# Patient Record
Sex: MALE | Race: WHITE | Employment: UNEMPLOYED | URBAN - METROPOLITAN AREA
[De-identification: names, ages, dates, MRNs, and addresses within clinical notes are randomized per-mention and may not be internally consistent; named-entity substitution may affect disease eponyms.]

---

## 2017-01-19 ENCOUNTER — TELEPHONE (OUTPATIENT)
Dept: FAMILY MEDICINE | Facility: CLINIC | Age: 57
End: 2017-01-19

## 2017-01-19 DIAGNOSIS — M54.2 CHRONIC NECK PAIN: Primary | ICD-10-CM

## 2017-01-19 DIAGNOSIS — S16.1XXD CERVICAL STRAIN, SUBSEQUENT ENCOUNTER: ICD-10-CM

## 2017-01-19 DIAGNOSIS — G89.29 CHRONIC NECK PAIN: Primary | ICD-10-CM

## 2017-01-19 NOTE — TELEPHONE ENCOUNTER
Gabapentin 300mg caps      Last Written Prescription Date: not on current med profile but has had an rx with refills for the last year  Last Quantity: na, # refills: na  Last Office Visit with G, P or [facility] prescribing provider: 11/4/16   Next 5 appointments (look out 90 days)     Jan 25, 2017 11:50 AM   [facility]el [facility]    [address] [facility] [id number]   [phone]            Jan 26, 2017 12:30 PM   Office Visit with Patel [facility]rez MD   [facility]    [address] [facility] [address]   [phone]                   CREATININE   Date Value Ref Range Status   09/06/2016 0.95 0.66 - 1.25 mg/dL Final     AST       55   8/27/2014  ALT       75   8/27/2014  BP Readings from Last 3 Encounters:   11/04/16 144/80   09/07/16 126/89   09/06/16 134/86   Thank You  Leah Esparza [facility]  [phone]

## 2017-01-20 RX ORDER — GABAPENTIN 600 MG/1
600 TABLET ORAL 2 TIMES DAILY
Qty: 180 TABLET | Refills: 3 | Status: SHIPPED | OUTPATIENT
Start: 2017-01-20 | End: 2017-01-24

## 2017-01-23 ENCOUNTER — TELEPHONE (OUTPATIENT)
Dept: FAMILY MEDICINE | Facility: CLINIC | Age: 57
End: 2017-01-23

## 2017-01-23 DIAGNOSIS — G89.29 CHRONIC NECK PAIN: Primary | ICD-10-CM

## 2017-01-23 DIAGNOSIS — M54.2 CHRONIC NECK PAIN: Primary | ICD-10-CM

## 2017-01-23 NOTE — TELEPHONE ENCOUNTER
Called to do PA over the phone.    They want to know if there is a reason he cannot take the capsules, which are covered by insurance.    If you want PA on the tablets they need to know why he cannot take the capsules    Please advise.    Nataly Bell MA/TC

## 2017-01-23 NOTE — TELEPHONE ENCOUNTER
Gabapentin requires a prior authorization.    [facility]: [phone]  ID:  [id number]      Karo Delgado    Pharmacy Technician  [facility]

## 2017-01-23 NOTE — TELEPHONE ENCOUNTER
Please resend rx for Gabapentin 300mg for 1/20/17.  Pharmacy did not receive.      Thank You  Leah Esparza, [facility]  [phone]

## 2017-01-24 RX ORDER — GABAPENTIN 300 MG/1
600 CAPSULE ORAL
Qty: 360 CAPSULE | Refills: 3 | Status: SHIPPED | OUTPATIENT
Start: 2017-01-24 | End: 2018-02-27

## 2017-01-25 NOTE — TELEPHONE ENCOUNTER
Capsules are fine.     The capsules are 300 mg so he would have to take 2 bid - let him know.    Patel Torrez M.D.

## 2017-01-25 NOTE — TELEPHONE ENCOUNTER
Called and informed patient. He said he has always taken the capsules. Never had tablets. I told him maybe the RX was ordered wrong???Nataly Bell MA/WENCESLAO

## 2017-02-06 ENCOUNTER — OFFICE VISIT (OUTPATIENT)
Dept: FAMILY MEDICINE | Facility: CLINIC | Age: 57
End: 2017-02-06
Payer: COMMERCIAL

## 2017-02-06 VITALS
OXYGEN SATURATION: 98 % | DIASTOLIC BLOOD PRESSURE: 94 MMHG | HEART RATE: 89 BPM | WEIGHT: 179 LBS | SYSTOLIC BLOOD PRESSURE: 180 MMHG | TEMPERATURE: 97.8 F | BODY MASS INDEX: 26.51 KG/M2 | HEIGHT: 69 IN

## 2017-02-06 DIAGNOSIS — S16.1XXD CERVICAL STRAIN, SUBSEQUENT ENCOUNTER: ICD-10-CM

## 2017-02-06 DIAGNOSIS — M54.2 CHRONIC NECK PAIN: Primary | ICD-10-CM

## 2017-02-06 DIAGNOSIS — G89.29 CHRONIC NECK PAIN: Primary | ICD-10-CM

## 2017-02-06 PROCEDURE — 80305 DRUG TEST PRSMV DIR OPT OBS: CPT | Performed by: FAMILY MEDICINE

## 2017-02-06 PROCEDURE — 99213 OFFICE O/P EST LOW 20 MIN: CPT | Performed by: FAMILY MEDICINE

## 2017-02-06 RX ORDER — OXYCODONE AND ACETAMINOPHEN 5; 325 MG/1; MG/1
1 TABLET ORAL 2 TIMES DAILY PRN
Qty: 60 TABLET | Refills: 0 | Status: SHIPPED | OUTPATIENT
Start: 2017-04-06 | End: 2017-05-08

## 2017-02-06 RX ORDER — OXYCODONE AND ACETAMINOPHEN 5; 325 MG/1; MG/1
1 TABLET ORAL 2 TIMES DAILY PRN
Qty: 60 TABLET | Refills: 0 | Status: SHIPPED | OUTPATIENT
Start: 2017-03-06 | End: 2017-05-08

## 2017-02-06 RX ORDER — OXYCODONE AND ACETAMINOPHEN 5; 325 MG/1; MG/1
1 TABLET ORAL 2 TIMES DAILY PRN
Qty: 60 TABLET | Refills: 0 | Status: SHIPPED | OUTPATIENT
Start: 2017-02-06 | End: 2017-05-08

## 2017-02-06 NOTE — MR AVS SNAPSHOT
"              After Visit Summary   2/6/2017    Marin Mcneil    MRN: [id number]           Patient Information     Date Of Birth          1960        Visit Information        Provider Department      2/6/2017 2:30 PM Patel Torrez MD [facility]        Today's Diagnoses     Chronic neck pain    -  1     Cervical strain, subsequent encounter           Care Instructions    See you back in 3-4 weeks for the shoulder. We can recheck your blood pressure at that time.        Follow-ups after your visit        Who to contact     If you have questions or need follow up information about today's clinic visit or your schedule please contact [facility] directly at [phone].  Normal or non-critical lab and imaging results will be communicated to you by MyChart, letter or phone within 4 business days after the clinic has received the results. If you do not hear from us within 7 days, please contact the clinic through [facility] or phone. If you have a critical or abnormal lab result, we will notify you by phone as soon as possible.  Submit refill requests through [employer] or call your pharmacy and they will forward the refill request to us. Please allow 3 business days for your refill to be completed.          Additional Information About Your Visit        MyChart Information     [employer] gives you secure access to your electronic health record. If you see a primary care provider, you can also send messages to your care team and make appointments. If you have questions, please call your primary care clinic.  If you do not have a primary care provider, please call [phone] and they will assist you.        Care EveryWhere ID     This is your Care EveryWhere ID. This could be used by other organizations to access your [facility] medical records  [id number]        Your Vitals Were     Pulse Temperature Height BMI (Body Mass Index) Pulse Oximetry       89 97.8  F (36.6  C) (Oral) 5' 9\" (1.753 " m) 26.42 kg/m2 98%        Blood Pressure from Last 3 Encounters:   02/06/17 180/94   11/04/16 144/80   09/07/16 126/89    Weight from Last 3 Encounters:   02/06/17 179 lb (81.194 kg)   11/04/16 184 lb (83.462 kg)   09/07/16 185 lb (83.915 kg)              Today, you had the following     No orders found for display         Today's Medication Changes          These changes are accurate as of: 2/6/17  2:48 PM.  If you have any questions, ask your nurse or doctor.               These medicines have changed or have updated prescriptions.        Dose/Directions    * buPROPion 150 MG 24 hr tablet   Commonly known as:  WELLBUTRIN XL   This may have changed:  how much to take   Used for:  Moderate persistent asthma without complication   Changed by:  Patel Torrez MD        Dose:  150 mg   Take 1 tablet (150 mg) by mouth every morning   Quantity:  180 tablet   Refills:  1       * buPROPion 300 MG 24 hr tablet   Commonly known as:  WELLBUTRIN XL   This may have changed:  Another medication with the same name was changed. Make sure you understand how and when to take each.   Used for:  Major depressive disorder, recurrent episode, mild (H), Generalized anxiety disorder   Changed by:  Bo Guevara PA-C        Dose:  300 mg   Take 1 tablet (300 mg) by mouth every morning   Quantity:  90 tablet   Refills:  1       * Notice:  This list has 2 medication(s) that are the same as other medications prescribed for you. Read the directions carefully, and ask your doctor or other care provider to review them with you.         Where to get your medicines      Some of these will need a paper prescription and others can be bought over the counter.  Ask your nurse if you have questions.     Bring a paper prescription for each of these medications    - oxyCODONE-acetaminophen 5-325 MG per tablet  - oxyCODONE-acetaminophen 5-325 MG per tablet  - oxyCODONE-acetaminophen 5-325 MG per tablet             Primary Care Provider Office  Phone # Fax #    Patel Torrez [phone]       [facility] [address] [address] [facility] [address]        Thank you!     Thank you for choosing [facility]  for your care. Our goal is always to provide you with excellent care. Hearing back from our patients is one way we can continue to improve our services. Please take a few minutes to complete the written survey that you may receive in the mail after your visit with us. Thank you!             Your Updated Medication List - Protect others around you: Learn how to safely use, store and throw away your medicines at www.disposemymeds.org.          This list is accurate as of: 2/6/17  2:48 PM.  Always use your most recent med list.                   Brand Name Dispense Instructions for use    albuterol 108 (90 BASE) MCG/ACT Inhaler    PROAIR HFA/PROVENTIL HFA/VENTOLIN HFA    1 Inhaler    Inhale 2 puffs into the lungs every 4 hours as needed for shortness of breath / dyspnea       atorvastatin 40 MG tablet    LIPITOR    90 tablet    Take 1 tablet (40 mg) by mouth daily       azelastine 0.05 % Soln ophthalmic solution    OPTIVAR    1 Bottle    Apply 1 drop to eye 2 times daily       budesonide-formoterol 80-4.5 MCG/ACT Inhaler    SYMBICORT    1 Inhaler    Inhale 2 puffs into the lungs 2 times daily       * buPROPion 150 MG 24 hr tablet    WELLBUTRIN XL    180 tablet    Take 1 tablet (150 mg) by mouth every morning       * buPROPion 300 MG 24 hr tablet    WELLBUTRIN XL    90 tablet    Take 1 tablet (300 mg) by mouth every morning       cetirizine 10 MG tablet    zyrTEC    90 tablet    Take 1 tablet (10 mg) by mouth every evening       doxazosin 4 MG tablet    CARDURA    90 tablet    Take 1 tablet (4 mg) by mouth At Bedtime       fluticasone 50 MCG/ACT spray    FLONASE    1 Bottle    Spray 1-2 sprays into both nostrils daily       gabapentin 300 MG capsule    NEURONTIN    360 capsule    Take 2 capsules (600 mg) by mouth 2 times  daily       lisinopril 10 MG tablet    PRINIVIL/ZESTRIL    90 tablet    Take 1 tablet (10 mg) by mouth daily       methocarbamol 500 MG tablet    ROBAXIN    60 tablet    Take 1 tablet (500 mg) by mouth 2 times daily as needed for muscle spasms       mometasone-formoterol 100-5 MCG/ACT oral inhaler    DULERA    13 g    Inhale 2 puffs into the lungs 2 times daily       montelukast 10 MG tablet    SINGULAIR    90 tablet    Take 1 tablet (10 mg) by mouth At Bedtime       olopatadine 0.1 % ophthalmic solution    PATANOL    5 mL    Place 1 drop into both eyes 2 times daily       omeprazole 20 MG CR capsule    priLOSEC    90 capsule    Take 1 capsule (20 mg) by mouth daily       * oxyCODONE-acetaminophen 5-325 MG per tablet    PERCOCET    60 tablet    Take 1 tablet by mouth 2 times daily as needed for moderate to severe pain       * oxyCODONE-acetaminophen 5-325 MG per tablet   Start taking on:  3/6/2017    PERCOCET    60 tablet    Take 1 tablet by mouth 2 times daily as needed for moderate to severe pain       * oxyCODONE-acetaminophen 5-325 MG per tablet   Start taking on:  4/6/2017    PERCOCET    60 tablet    Take 1 tablet by mouth 2 times daily as needed for moderate to severe pain       * Notice:  This list has 5 medication(s) that are the same as other medications prescribed for you. Read the directions carefully, and ask your doctor or other care provider to review them with you.
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
## 2017-02-06 NOTE — Clinical Note
[facility]    02/06/2017    Patient: Marin Mcneil  YOB: 1960  Medical Record Number: [id number]                                                                  Controlled Substance Agreement  I understand that my care provider has prescribed controlled substances (narcotics, tranquilizers, and/or stimulants) to help manage my condition(s).  I am taking this medicine to help me function or work.  I know that this is strong medicine.  It could have serious side effects and even cause a dependency on the drug.  If I stop these medicines suddenly, I could have severe withdrawal symptoms.    The risks, benefits, and side effects of these medication(s) were explained to me.  I agree that:  1. I will take part in other treatments as advised by my provider.  This may be psychiatry or counseling, physical therapy, behavioral therapy, group treatment, or a referral to a pain clinic.  I will reduce or stop my medicine when my provider tells me to do so.   2. I will take my medicines as prescribed.  I will not change the dose or schedule unless my provider tells me to.  There will be no refills if I  run out early.   I may be contacted at any time without warning and asked to complete a drug test or pill count.   3. I will keep all my appointments at the clinic.  If I miss appointments or fail to follow instructions, my provider may stop my medicine.  4. I will not ask other providers to prescribe controlled substances. And I will not accept controlled substances from other people. If I need another prescribed controlled substance for a new reason, I will notify my provider within one business day.  5. If I enroll in the [place] Medical Marijuana program, I will tell my provider.  I will also sign an agreement to share my medical records with my provider.  6. I will use one pharmacy to fill all of my controlled substance prescriptions.  If my prescription is mailed to my pharmacy, it may take 5  to 7 days for my medicine to be ready.  7. I understand that my provider, clinic care team, and pharmacy can track controlled substance prescriptions from other providers through a central database (prescription monitoring program).  8. I will bring in my list of medications (or my medicine bottles) each time I come to the clinic.  REV- 04/2016                                                                                                                                            Page 1 of 2      [facility]    02/06/2017    Patient: Marin Mcneil  YOB: 1960  Medical Record Number: [id number]    9. Refills of controlled substances will be made only during office hours.  It is up to me to make sure that I do not run out of my medicines on weekends or holidays.    10. I am responsible for my prescriptions.  If the medicine is lost or stolen, it will not be replaced.   I also agree not to share these medicines with anyone.  11. I agree to not use ANY illegal or recreational drugs.  This includes marijuana, cocaine, bath salts or other drugs.  I agree not to use alcohol unless my provider says I may.  I agree to give urine samples whenever asked.  If I fail to give a urine sample, the provider may stop my medicine.     12. I will tell my nurse or provider right away if I become pregnant or have a new medical problem treated outside of [facility]  13. I understand that this medicine can affect my thinking and judgment.  It may be unsafe for me to drive, use machinery and do dangerous tasks.  I will not do any of these things until I know how the medicine affects me.  If my dose changes, I will wait to see how it affects me.  I will contact my provider if I have concerns about medicine side effects.  I understand that if I do not follow any of the conditions above, my prescriptions or treatment may be stopped.    I agree that my provider, clinic care team, and pharmacy may work with any  city, state or federal law enforcement agency that investigates the misuse, sale, or other diversion of my controlled medicine. I will allow my provider to discuss my care with or share a copy of this agreement with any other treating provider, pharmacy or emergency room where I receive care.  I agree to give up (waive) any right of privacy or confidentiality with respect to these authorizations.   I have read this agreement and have asked questions about anything I did not understand.   ___________________________________    ___________________________  Patient Signature                                                           Date and Time  ___________________________________     ____________________________  Witness                                                                            Date and Time  ___________________________________  REFUGIO LAGUERRE MD  REV-  04/2016                                                                                                                                                                 Page 2 of 2  Opioid Pain Medicines (also known as Narcotics)  What You Need to Know      What are opioids?   Opioids are pain medicines that must be prescribed by a doctor. Examples are:     morphine (MS Contin, Cece)    oxycodone (Oxycontin)    oxycodone and acetaminophen (Percocet)    hydrocodone and acetaminophen (Vicodin, Norco)     fentanyl patch (Duragesic)     hydromorphone (Dilaudid)     methadone     What do opioids do well?   Opioids are best for short-term pain after a surgery or injury. They also work well for cancer pain. Unlike other pain medicines, they do not cause liver or kidney failure or ulcers. They may help some people with long-lasting (chronic) pain.     What do opioids NOT do well?   Opioids never get rid of pain entirely, and they do not work well for most patients with chronic pain. Opioids do not reduce swelling, one of the causes of pain. They also don t  work well for nerve pain.     Side effects  Talk to your doctor before you start or decide to keep taking one of these medicines. Side effects include:    Lowers your breathing rate enough that it could cause death    Death due to taking more than the prescribed dose    Serious lifelong opioid use      Dependence is not the same as addiction. Addiction is when people keep using a substance that harms their body, their mind or their relations with others. If you have a history of drug or alcohol abuse, taking opioids can cause a relapse.  Over time, opioids don t work as well. Most people will need higher and higher doses. The higher the dose, the more serious the side effects. We don t know the long-term effects of opioids.   People who have used opioids for a long time have a lower quality of life, worse depression, higher levels of pain and more visits to doctors.  Overdose from prescription drugs is the second leading cause of death in the [place] The risk of overdose rises when opioids are taken with other drugs such as:    Medicines used for anxiety and panic attacks (such as lorazepam, alprazolam, clonazepam    Other sedatives    Alcohol    Illegal drugs such as heroin  Never share your opioids with others. Be sure to store opioids in a secure place, locked if possible.Young children can easily swallow them and overdose.     Are there other ways to manage pain?  Ways to help reduce pain:    Exercise every day.    Treat health problems that may be causing pain.    Treat mental health problems like depression and anxiety.     Worse depression symptoms; Less pleasure in things you usually enjoy    Feeling tired or sluggish    Slower thoughts or cloudy thinking    Being more sensitive to pain over time; Pain is harder to control.    Trouble sleeping or restless sleep    Changes in hormone levels (for example, less testosterone).     Changes in sex drive or ability to have sex    Long lasting nausea and  constipation    Trouble breathing while asleep; This is worse with lung problems like COPD or sleep apnea.    Unsafe driving    Getting sick more often    Itching    Feeling dizzy    Dry mouth    Sweating    Trouble emptying the bladder (peeing). This is worse if you have an enlarged prostate or get urinary tract infections (UTIs).    What else should I know about opioids?  When someone takes opioids for too long or too often, they become dependent. This means that if you stop or reduce the medicine too quickly, you will have withdrawal symptoms.          Practice good sleep habits.  Try to go to bed and get up at the same time every day.    Stop smoking.  Tobacco use can make pain worse.    Do things that you enjoy.    Find a way to work through pain without drugs.  Try deep breathing, meditation, visual imagery and aromatherapy.    Ask your doctor to help you create a plan to manage your pain.
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
## 2017-02-07 LAB
AMPHETAMINES UR QL: ABNORMAL
BARBITURATES UR QL: ABNORMAL
BENZODIAZ UR QL: ABNORMAL
CANNABINOIDS UR QL: ABNORMAL
COCAINE UR QL: ABNORMAL
MDA UR QL SCN: ABNORMAL
METHADONE UR QL SCN: ABNORMAL
METHAMPHET UR QL SCN: ABNORMAL
OPIATES UR QL SCN: ABNORMAL
OXYCODONE UR QL: ABNORMAL
PCP UR QL SCN: ABNORMAL
TRICYCLICS UR QL SCN: ABNORMAL

## 2017-03-30 DIAGNOSIS — S16.1XXD CERVICAL STRAIN, SUBSEQUENT ENCOUNTER: ICD-10-CM

## 2017-03-30 DIAGNOSIS — G89.29 CHRONIC NECK PAIN: ICD-10-CM

## 2017-03-30 DIAGNOSIS — M54.2 CHRONIC NECK PAIN: ICD-10-CM

## 2017-03-30 RX ORDER — METHOCARBAMOL 500 MG/1
500 TABLET, FILM COATED ORAL 2 TIMES DAILY PRN
Qty: 60 TABLET | Refills: 5 | Status: SHIPPED | OUTPATIENT
Start: 2017-03-30 | End: 2017-07-26

## 2017-03-30 NOTE — TELEPHONE ENCOUNTER
Methocarbamol      Last Written Prescription Date: 9-7-16  Last Fill Quantity: 60,  # refills: 5   Last Office Visit with G, P or [facility] prescribing provider: 2-6-17    [facility]  Pharmacy [facility]  On Behalf of [facility]

## 2017-05-05 DIAGNOSIS — I10 BENIGN ESSENTIAL HYPERTENSION: ICD-10-CM

## 2017-05-05 DIAGNOSIS — F41.1 GENERALIZED ANXIETY DISORDER: ICD-10-CM

## 2017-05-05 DIAGNOSIS — F33.0 MAJOR DEPRESSIVE DISORDER, RECURRENT EPISODE, MILD (H): ICD-10-CM

## 2017-05-05 DIAGNOSIS — N40.1 BENIGN NON-NODULAR PROSTATIC HYPERPLASIA WITH LOWER URINARY TRACT SYMPTOMS: ICD-10-CM

## 2017-05-05 NOTE — TELEPHONE ENCOUNTER
Doxazosin     Last Written Prescription Date: 9/6/16  Last Fill Quantity: 90, # refills: 1  Last Office Visit with [facility], [facility] or [facility] prescribing provider: 9/7/16   Next 5 appointments (look out 90 days)     May 08, 2017 11:10 AM CDT   [facility]    [address] [facility] [address]   [phone]                   BP Readings from Last 3 Encounters:   02/06/17 (!) 180/94   11/04/16 144/80   09/07/16 126/89     Last PHQ-9 score on record=   PHQ-9 SCORE 9/6/2016   Total Score -   Total Score MyChart -   Total Score 8       Buproprion       Last Written Prescription Date: 9/6/16  Last Fill Quantity: 90; # refills: 1  Last Office Visit with [facility], [facility] or [facility] prescribing provider:  9/7/16   Next 5 appointments (look out 90 days)     May 08, 2017 11:10 AM CDT   [facility]    [address] [facility] [address]   [phone]                   Last PHQ-9 score on record=   PHQ-9 SCORE 9/6/2016   Total Score -   Total Score MyChart -   Total Score 8       Lab Results   Component Value Date    AST 55 08/27/2014     Lab Results   Component Value Date    ALT 75 08/27/2014         Lisinopril      Last Written Prescription Date: 9/6/16  Last Fill Quantity: 90, # refills: 1  Last Office Visit with G, [facility] or [facility] prescribing provider: 9/7/16  Next 5 appointments (look out 90 days)     May 08, 2017 11:10 AM CDT   [facility]    [address] [facility] [address]   [phone]                   Potassium   Date Value Ref Range Status   09/06/2016 4.4 3.4 - 5.3 mmol/L Final     Creatinine   Date Value Ref Range Status   09/06/2016 0.95 0.66 - 1.25 mg/dL Final     BP Readings from Last 3 Encounters:   02/06/17 (!) 180/94   11/04/16 144/80   09/07/16 126/89          Karo Raysbie    Pharmacy Technician  [facility]

## 2017-05-08 ENCOUNTER — OFFICE VISIT (OUTPATIENT)
Dept: FAMILY MEDICINE | Facility: CLINIC | Age: 57
End: 2017-05-08
Payer: MEDICAID

## 2017-05-08 VITALS
OXYGEN SATURATION: 98 % | TEMPERATURE: 97.4 F | WEIGHT: 172 LBS | HEART RATE: 71 BPM | BODY MASS INDEX: 25.4 KG/M2 | SYSTOLIC BLOOD PRESSURE: 128 MMHG | DIASTOLIC BLOOD PRESSURE: 80 MMHG

## 2017-05-08 DIAGNOSIS — N40.1 BENIGN NON-NODULAR PROSTATIC HYPERPLASIA WITH LOWER URINARY TRACT SYMPTOMS: ICD-10-CM

## 2017-05-08 DIAGNOSIS — E78.5 DYSLIPIDEMIA: ICD-10-CM

## 2017-05-08 DIAGNOSIS — F41.1 GENERALIZED ANXIETY DISORDER: ICD-10-CM

## 2017-05-08 DIAGNOSIS — J45.40 MODERATE PERSISTENT ASTHMA WITHOUT COMPLICATION: ICD-10-CM

## 2017-05-08 DIAGNOSIS — F33.0 MAJOR DEPRESSIVE DISORDER, RECURRENT EPISODE, MILD (H): Primary | ICD-10-CM

## 2017-05-08 DIAGNOSIS — M79.621 PAIN OF RIGHT UPPER ARM: ICD-10-CM

## 2017-05-08 DIAGNOSIS — I10 ESSENTIAL HYPERTENSION WITH GOAL BLOOD PRESSURE LESS THAN 140/90: ICD-10-CM

## 2017-05-08 PROCEDURE — 99214 OFFICE O/P EST MOD 30 MIN: CPT | Performed by: FAMILY MEDICINE

## 2017-05-08 RX ORDER — OXYCODONE AND ACETAMINOPHEN 5; 325 MG/1; MG/1
1 TABLET ORAL 2 TIMES DAILY PRN
Qty: 60 TABLET | Refills: 0 | Status: SHIPPED | OUTPATIENT
Start: 2017-05-08 | End: 2017-05-08

## 2017-05-08 RX ORDER — BUPROPION HYDROCHLORIDE 300 MG/1
300 TABLET ORAL EVERY MORNING
Qty: 30 TABLET | Refills: 0 | Status: SHIPPED | OUTPATIENT
Start: 2017-05-08 | End: 2017-05-08

## 2017-05-08 RX ORDER — BUPROPION HYDROCHLORIDE 300 MG/1
300 TABLET ORAL EVERY MORNING
Qty: 90 TABLET | Refills: 1 | Status: SHIPPED | OUTPATIENT
Start: 2017-05-08 | End: 2017-07-27

## 2017-05-08 RX ORDER — OXYCODONE AND ACETAMINOPHEN 5; 325 MG/1; MG/1
1 TABLET ORAL 2 TIMES DAILY PRN
Qty: 60 TABLET | Refills: 0 | Status: SHIPPED | OUTPATIENT
Start: 2017-07-08 | End: 2017-05-08

## 2017-05-08 RX ORDER — OXYCODONE AND ACETAMINOPHEN 5; 325 MG/1; MG/1
1 TABLET ORAL 2 TIMES DAILY PRN
Qty: 60 TABLET | Refills: 0 | Status: SHIPPED | OUTPATIENT
Start: 2017-06-08 | End: 2017-05-08

## 2017-05-08 RX ORDER — DOXAZOSIN 4 MG/1
4 TABLET ORAL AT BEDTIME
Qty: 90 TABLET | Refills: 0 | Status: SHIPPED | OUTPATIENT
Start: 2017-05-08 | End: 2017-05-08

## 2017-05-08 RX ORDER — DOXAZOSIN 4 MG/1
4 TABLET ORAL AT BEDTIME
Qty: 90 TABLET | Refills: 1 | Status: SHIPPED | OUTPATIENT
Start: 2017-05-08 | End: 2017-08-22

## 2017-05-08 RX ORDER — BUPROPION HYDROCHLORIDE 300 MG/1
TABLET ORAL
Qty: 90 TABLET | Refills: 1 | OUTPATIENT
Start: 2017-05-08

## 2017-05-08 RX ORDER — LISINOPRIL 10 MG/1
TABLET ORAL
Qty: 90 TABLET | Refills: 1 | OUTPATIENT
Start: 2017-05-08

## 2017-05-08 RX ORDER — LISINOPRIL 10 MG/1
10 TABLET ORAL DAILY
Qty: 90 TABLET | Refills: 0 | Status: SHIPPED | OUTPATIENT
Start: 2017-05-08 | End: 2017-05-08

## 2017-05-08 RX ORDER — LISINOPRIL 10 MG/1
10 TABLET ORAL DAILY
Qty: 90 TABLET | Refills: 1 | Status: SHIPPED | OUTPATIENT
Start: 2017-05-08 | End: 2017-11-20

## 2017-05-08 RX ORDER — DOXAZOSIN 4 MG/1
TABLET ORAL
Qty: 90 TABLET | Refills: 1 | OUTPATIENT
Start: 2017-05-08

## 2017-05-08 ASSESSMENT — ANXIETY QUESTIONNAIRES
3. WORRYING TOO MUCH ABOUT DIFFERENT THINGS: SEVERAL DAYS
IF YOU CHECKED OFF ANY PROBLEMS ON THIS QUESTIONNAIRE, HOW DIFFICULT HAVE THESE PROBLEMS MADE IT FOR YOU TO DO YOUR WORK, TAKE CARE OF THINGS AT HOME, OR GET ALONG WITH OTHER PEOPLE: SOMEWHAT DIFFICULT
5. BEING SO RESTLESS THAT IT IS HARD TO SIT STILL: SEVERAL DAYS
7. FEELING AFRAID AS IF SOMETHING AWFUL MIGHT HAPPEN: SEVERAL DAYS
2. NOT BEING ABLE TO STOP OR CONTROL WORRYING: SEVERAL DAYS
1. FEELING NERVOUS, ANXIOUS, OR ON EDGE: MORE THAN HALF THE DAYS
6. BECOMING EASILY ANNOYED OR IRRITABLE: SEVERAL DAYS
GAD7 TOTAL SCORE: 8

## 2017-05-08 ASSESSMENT — PATIENT HEALTH QUESTIONNAIRE - PHQ9: 5. POOR APPETITE OR OVEREATING: SEVERAL DAYS

## 2017-05-08 NOTE — LETTER
My Asthma Action Plan  Name: Marin Mcneil   YOB: 1960  Date: 5/8/2017   My doctor: REFUGIO LAGUERRE MD   My clinic: [facility]        My Control Medicine: { :922743}  My Rescue Medicine: { :563693}  {AAP include Oral Steroid:011786} My Asthma Severity: { :926136}  Avoid your asthma triggers: { :949452}        {Is patient a child or adult?:496143:::0}       GREEN ZONE     Good Control    I feel good    No cough or wheeze    Can work, sleep and play without asthma symptoms       Take your asthma control medicine every day.     1. If exercise triggers your asthma, take your rescue medication    15 minutes before exercise or sports, and    During exercise if you have asthma symptoms  2. Spacer to use with inhaler: If you have a spacer, make sure to use it with your inhaler             YELLOW ZONE     Getting Worse  I have ANY of these:    I do not feel good    Cough or wheeze    Chest feels tight    Wake up at night   1. Keep taking your Green Zone medications  2. Start taking your rescue medicine:    every 20 minutes for up to 1 hour. Then every 4 hours for 24-48 hours.  3. If you stay in the Yellow Zone for more than 12-24 hours, contact your doctor.  4. If you do not return to the Green Zone in 12-24 hours or you get worse, start taking your oral steroid medicine if prescribed by your provider.           RED ZONE     Medical Alert - Get Help  I have ANY of these:    I feel awful    Medicine is not helping    Breathing getting harder    Trouble walking or talking    Nose opens wide to breathe       1. Take your rescue medicine NOW  2. If your provider has prescribed an oral steroid medicine, start taking it NOW  3. Call your doctor NOW  4. If you are still in the Red Zone after 20 minutes and you have not reached your doctor:    Take your rescue medicine again and    Call 911 or go to the emergency room right away    See your regular doctor within 2 weeks of an Emergency Room or Urgent  Care visit for follow-up treatment.            Annual Reminders:  Meet with Asthma Educator,  Flu Shot in the Fall, consider Pneumonia Vaccination for patients with asthma (aged 19 and older).    Pharmacy: [place] [phone], [address]                    Asthma Triggers  How To Control Things That Make Your Asthma Worse    Triggers are things that make your asthma worse.  Look at the list below to help you find your triggers and what you can do about them.  You can help prevent asthma flare-ups by staying away from your triggers.      Trigger                                                          What you can do   Cigarette Smoke  Tobacco smoke can make asthma worse. Do not allow smoking in your home, car or around you.  Be sure no one smokes at a child s day care or school.  If you smoke, ask your health care provider for ways to help you quit.  Ask family members to quit too.  Ask your health care provider for a referral to Quit Plan to help you quit smoking, or call 5-552-853-PLAN.     Colds, Flu, Bronchitis  These are common triggers of asthma. Wash your hands often.  Don t touch your eyes, nose or mouth.  Get a flu shot every year.     Dust Mites  These are tiny bugs that live in cloth or carpet. They are too small to see. Wash sheets and blankets in hot water every week.   Encase pillows and mattress in dust mite proof covers.  Avoid having carpet if you can. If you have carpet, vacuum weekly.   Use a dust mask and HEPA vacuum.   Pollen and Outdoor Mold  Some people are allergic to trees, grass, or weed pollen, or molds. Try to keep your windows closed.  Limit time out doors when pollen count is high.   Ask you health care provider about taking medicine during allergy season.     Animal Dander  Some people are allergic to skin flakes, urine or saliva from pets with fur or feathers. Keep pets with fur or feathers out of your home.    If you can t keep the pet outdoors,  then keep the pet out of your bedroom.  Keep the bedroom door closed.  Keep pets off cloth furniture and away from stuffed toys.     Mice, Rats, and Cockroaches  Some people are allergic to the waste from these pests.   Cover food and garbage.  Clean up spills and food crumbs.  Store grease in the refrigerator.   Keep food out of the bedroom.   Indoor Mold  This can be a trigger if your home has high moisture. Fix leaking faucets, pipes, or other sources of water.   Clean moldy surfaces.  Dehumidify basement if it is damp and smelly.   Smoke, Strong Odors, and Sprays  These can reduce air quality. Stay away from strong odors and sprays, such as perfume, powder, hair spray, paints, smoke incense, paint, cleaning products, candles and new carpet.   Exercise or Sports  Some people with asthma have this trigger. Be active!  Ask your doctor about taking medicine before sports or exercise to prevent symptoms.    Warm up for 5-10 minutes before and after sports or exercise.     Other Triggers of Asthma  Cold air:  Cover your nose and mouth with a scarf.  Sometimes laughing or crying can be a trigger.  Some medicines and food can trigger asthma.

## 2017-05-08 NOTE — MR AVS SNAPSHOT
After Visit Summary   5/8/2017    Marin Mcneil    MRN: [id number]           Patient Information     Date Of Birth          1960        Visit Information        Provider Department      5/8/2017 11:10 AM Patel Torrez MD [facility]        Today's Diagnoses     Major depressive disorder, recurrent episode, mild (H)    -  1    Generalized anxiety disorder        Benign non-nodular prostatic hyperplasia with lower urinary tract symptoms        Benign essential hypertension        Pain of right upper arm        Dyslipidemia        Essential hypertension with goal blood pressure less than 140/90        Moderate persistent asthma without complication          Care Instructions    1. Give fasting blood sample on the next appointment.    2. Make an appointment with orthopedics for the right arm pain - call [phone] or stop by the .    3. If all is well, see you back in 6 months.              Follow-ups after your visit        Additional Services     ORTHOPEDICS ADULT REFERRAL       Your provider has referred you to: [facility]: [facility] [phone]   [email or web address]    Please be aware that coverage of these services is subject to the terms and limitations of your health insurance plan.  Call member services at your health plan with any benefit or coverage questions.      Please bring the following to your appointment:    >>   Any x-rays, CTs or MRIs which have been performed.  Contact the facility where they were done to arrange for  prior to your scheduled appointment.  Any new CT, MRI or other procedures ordered by your specialist must be performed at a [facility] facility or coordinated by your clinic's referral office.    >>   List of current medications   >>   This referral request   >>   Any documents/labs given to you for this referral                  Future tests that were ordered for you today     Open Future  Orders        Priority Expected Expires Ordered    Lipid panel reflex to direct LDL Routine  12/8/2017 5/8/2017    Basic metabolic panel Routine  12/8/2017 5/8/2017    ALT Routine  12/8/2017 5/8/2017            Who to contact     If you have questions or need follow up information about today's clinic visit or your schedule please contact [facility] directly at [phone].  Normal or non-critical lab and imaging results will be communicated to you by mChronhart, letter or phone within 4 business days after the clinic has received the results. If you do not hear from us within 7 days, please contact the clinic through Altaviant or phone. If you have a critical or abnormal lab result, we will notify you by phone as soon as possible.  Submit refill requests through Sparkbrowser or call your pharmacy and they will forward the refill request to us. Please allow 3 business days for your refill to be completed.          Additional Information About Your Visit        mChronharPayPal Information     Sparkbrowser gives you secure access to your electronic health record. If you see a primary care provider, you can also send messages to your care team and make appointments. If you have questions, please call your primary care clinic.  If you do not have a primary care provider, please call [phone] and they will assist you.        Care EveryWhere ID     This is your Care EveryWhere ID. This could be used by other organizations to access your [facility] medical records  [id number]        Your Vitals Were     Pulse Temperature Pulse Oximetry BMI (Body Mass Index)          71 97.4  F (36.3  C) (Oral) 98% 25.4 kg/m2         Blood Pressure from Last 3 Encounters:   05/08/17 128/80   02/06/17 (!) 180/94   11/04/16 144/80    Weight from Last 3 Encounters:   05/08/17 172 lb (78 kg)   02/06/17 179 lb (81.2 kg)   11/04/16 184 lb (83.5 kg)              We Performed the Following     Asthma Action Plan (AAP)     DEPRESSION ACTION PLAN (DAP)      ORTHOPEDICS ADULT REFERRAL          Today's Medication Changes          These changes are accurate as of: 5/8/17 11:46 AM.  If you have any questions, ask your nurse or doctor.               These medicines have changed or have updated prescriptions.        Dose/Directions    buPROPion 300 MG 24 hr tablet   Commonly known as:  WELLBUTRIN XL   This may have changed:  additional instructions   Used for:  Major depressive disorder, recurrent episode, mild (H), Generalized anxiety disorder   Changed by:  Patel Torrez MD        Dose:  300 mg   Take 1 tablet (300 mg) by mouth every morning   Quantity:  90 tablet   Refills:  1         Stop taking these medicines if you haven't already. Please contact your care team if you have questions.     oxyCODONE-acetaminophen 5-325 MG per tablet   Commonly known as:  PERCOCET   Stopped by:  Patel Torrez MD                Where to get your medicines      These medications were sent to [address], [facility] [place] [facility]  [id number] [place] [address]     Phone:  [phone]     buPROPion 300 MG 24 hr tablet    doxazosin 4 MG tablet    lisinopril 10 MG tablet                Primary Care Provider Office Phone # Fax #    Patel Torrez [phone]       [address] [address]        Thank you!     Thank you for choosing [facility]  for your care. Our goal is always to provide you with excellent care. Hearing back from our patients is one way we can continue to improve our services. Please take a few minutes to complete the written survey that you may receive in the mail after your visit with us. Thank you!             Your Updated Medication List - Protect others around you: Learn how to safely use, store and throw away your medicines at www.disposemymeds.org.          This list is accurate as of: 5/8/17 11:46 AM.  Always use your most recent med list.                    Brand Name Dispense Instructions for use    albuterol 108 (90 BASE) MCG/ACT Inhaler    PROAIR HFA/PROVENTIL HFA/VENTOLIN HFA    1 Inhaler    Inhale 2 puffs into the lungs every 4 hours as needed for shortness of breath / dyspnea       atorvastatin 40 MG tablet    LIPITOR    90 tablet    Take 1 tablet (40 mg) by mouth daily       azelastine 0.05 % Soln ophthalmic solution    OPTIVAR    1 Bottle    Apply 1 drop to eye 2 times daily       budesonide-formoterol 80-4.5 MCG/ACT Inhaler    SYMBICORT    1 Inhaler    Inhale 2 puffs into the lungs 2 times daily       buPROPion 300 MG 24 hr tablet    WELLBUTRIN XL    90 tablet    Take 1 tablet (300 mg) by mouth every morning       cetirizine 10 MG tablet    zyrTEC    90 tablet    Take 1 tablet (10 mg) by mouth every evening       doxazosin 4 MG tablet    CARDURA    90 tablet    Take 1 tablet (4 mg) by mouth At Bedtime       fluticasone 50 MCG/ACT spray    FLONASE    1 Bottle    Spray 1-2 sprays into both nostrils daily       gabapentin 300 MG capsule    NEURONTIN    360 capsule    Take 2 capsules (600 mg) by mouth 2 times daily       lisinopril 10 MG tablet    PRINIVIL/ZESTRIL    90 tablet    Take 1 tablet (10 mg) by mouth daily       methocarbamol 500 MG tablet    ROBAXIN    60 tablet    Take 1 tablet (500 mg) by mouth 2 times daily as needed for muscle spasms       mometasone-formoterol 100-5 MCG/ACT oral inhaler    DULERA    13 g    Inhale 2 puffs into the lungs 2 times daily       montelukast 10 MG tablet    SINGULAIR    90 tablet    Take 1 tablet (10 mg) by mouth At Bedtime       olopatadine 0.1 % ophthalmic solution    PATANOL    5 mL    Place 1 drop into both eyes 2 times daily       omeprazole 20 MG CR capsule    priLOSEC    90 capsule    Take 1 capsule (20 mg) by mouth daily

## 2017-05-08 NOTE — LETTER
My Depression Action Plan  Name: Marin Mcneil   Date of Birth 1960  Date: 5/8/2017    My doctor: Patel Torrez   My clinic: [facility]  [id number] [place] [id number] [phone]          GREEN    ZONE   Good Control    What it looks like:     Things are going generally well. You have normal up s and down s. You may even feel depressed from time to time, but bad moods usually last less than a day.   What you need to do:  1. Continue to care for yourself (see self care plan)  2. Check your depression survival kit and update it as needed  3. Follow your physician s recommendations including any medication.  4. Do not stop taking medication unless you consult with your physician first.           YELLOW         ZONE Getting Worse    What it looks like:     Depression is starting to interfere with your life.     It may be hard to get out of bed; you may be starting to isolate yourself from others.    Symptoms of depression are starting to last most all day and this has happened for several days.     You may have suicidal thoughts but they are not constant.   What you need to do:     1. Call your care team, your response to treatment will improve if you keep your care team informed of your progress. Yellow periods are signs an adjustment may need to be made.     2. Continue your self-care, even if you have to fake it!    3. Talk to someone in your support network    4. Open up your depression survival kit           RED    ZONE Medical Alert - Get Help    What it looks like:     Depression is seriously interfering with your life.     You may experience these or other symptoms: You can t get out of bed most days, can t work or engage in other necessary activities, you have trouble taking care of basic hygiene, or basic responsibilities, thoughts of suicide or death that will not go away, self-injurious behavior.     What you need to do:  1. Call your care team and  request a same-day appointment. If they are not available (weekends or after hours) call your local crisis line, emergency room or 911.        Depression Self Care Plan / Survival Kit    Self-Care for Depression  Here s the deal. Your body and mind are really not as separate as most people think.  What you do and think affects how you feel and how you feel influences what you do and think. This means if you do things that people who feel good do, it will help you feel better.  Sometimes this is all it takes.  There is also a place for medication and therapy depending on how severe your depression is, so be sure to consult with your medical provider and/ or Behavioral Health Consultant if your symptoms are worsening or not improving.     In order to better manage my stress, I will:    Exercise  Get some form of exercise, every day. This will help reduce pain and release endorphins, the  feel good  chemicals in your brain. This is almost as good as taking antidepressants!  This is not the same as joining a gym and then never going! (they count on that by the way ) It can be as simple as just going for a walk or doing some gardening, anything that will get you moving.      Hygiene   Maintain good hygiene (Get out of bed in the morning, Make your bed, Brush your teeth, Take a shower, and Get dressed like you were going to work, even if you are unemployed).  If your clothes don't fit try to get ones that do.    Diet  I will strive to eat foods that are good for me, drink plenty of water, and avoid excessive sugar, caffeine, alcohol, and other mood-altering substances.  Some foods that are helpful in depression are: complex carbohydrates, B vitamins, flaxseed, fish or fish oil, fresh fruits and vegetables.    Psychotherapy  I agree to participate in Individual Therapy (if recommended).    Medication  If prescribed medications, I agree to take them.  Missing doses can result in serious side effects.  I understand that  drinking alcohol, or other illicit drug use, may cause potential side effects.  I will not stop my medication abruptly without first discussing it with my provider.    Staying Connected With Others  I will stay in touch with my friends, family members, and my primary care provider/team.    Use your imagination  Be creative.  We all have a creative side; it doesn t matter if it s oil painting, sand castles, or mud pies! This will also kick up the endorphins.    Witness Beauty  (AKA stop and smell the roses) Take a look outside, even in mid-winter. Notice colors, textures. Watch the squirrels and birds.     Service to others  Be of service to others.  There is always someone else in need.  By helping others we can  get out of ourselves  and remember the really important things.  This also provides opportunities for practicing all the other parts of the program.    Humor  Laugh and be silly!  Adjust your TV habits for less news and crime-drama and more comedy.    Control your stress  Try breathing deep, massage therapy, biofeedback, and meditation. Find time to relax each day.     My support system    Clinic Contact:  Phone number:    Contact 1:  Phone number:    Contact 2:  Phone number:    [facility]/:  Phone number:    Therapist:  Phone number:    Local crisis center:    Phone number:    Other community support:  Phone number:

## 2017-05-08 NOTE — PROGRESS NOTES
HPI:    Juan Alberto is a 57 year old male here for follow-up:    Right arm pain - chronic intermittent. Stuck with 4 boggs around March 2017 made things worse. The pain is on the proximal forearm but goes distally and proximally to the shoulder. He has had neck issues related to an accident.       HTN - dx'd in Aug 2014. Not checking at home. Controlled in clinic.  Treatment:    Lisinopril 10 mg qd - no side effects   Cardura (see BPH below) 4 mg qd. No side effects.    Last Basic Metabolic Panel:  NA      141   2/26/2016   POTASSIUM      4.1   2/26/2016  CHLORIDE      105   2/26/2016  RUFINO      9.3   2/26/2016  CO2       28   2/26/2016  BUN       15   2/26/2016  CR     0.96   2/26/2016  GLC       97   2/26/2016  CBC RESULTS:   Recent Labs   Lab Test  02/26/16   0908   WBC  6.5   RBC  5.30   HGB  16.1   HCT  47.0   MCV  89   MCH  30.4   MCHC  34.3   RDW  12.6   PLT  267       Diverticulitis - CT on 2/26/13 as below. His symptoms had not recurred since treatment. Colonoscopy was done 3/28/13. Bx showed inflammation. Repeat in 5 years was advised.  IMPRESSION:  1. Moderate colitis involving the descending colon and sigmoid colon.  No abscess or free air. This may be from an infectious, inflammatory,  or ischemic etiology.  2. Some wall thickening of a mildly distended urinary bladder. A  portion of this may be related to nondistention. However, cannot  exclude an infectious or inflammatory cystitis.  3. Tiny nonobstructing stone within the right kidney.  4. Small focal fluid collection at the right inguinal region is noted  and of uncertain clinical significance. It only measures 1.4 cm.    Asthma/allergies - Wheezing and shortness of breath since age 9. Never hospitalized. Has had neb machines and steroids in the past. Symptoms get worse during allergy seasons in the spring and fall. Does not wake up at night with shortness of breath. Triggers are cold air and exercise.   Treatment:   Flovent 220 did not work.    Advair  was not covered.    Zyrtec 10 mg qd   Singulair 10 mg qd.    Optivar prn and Flonase.    Symbicort 80/4.5, 2 puffs bid.    Alb prn.   Controlled: no - he believes this is related to allergies    ACT Total Scores 5/8/2017   ACT TOTAL SCORE (Goal Greater than or Equal to 20) 15   In the past 12 months, how many times did you visit the emergency room for your asthma without being admitted to the hospital? 0   In the past 12 months, how many times were you hospitalized overnight because of your asthma? 0     Depression/AIDEN - symptoms are low mood, low motivation, overwhelmed, on edge, panic. Lots of regret about his life. No SI or HI.   Treatment:   Celexa stopped due to sexual side effects.   Wellbutrin 300 mg qd helps - no side effects.   Xanax prn but not lately - try to avoid due to h/o alcoholism.   Referred to Abby Brooks, behavioral specialist, but he did not go.     PHQ-9 SCORE 3/1/2016 9/6/2016 5/8/2017   Total Score - - -   Total Score MyChart - - -   Total Score 8 8 9       AIDEN-7 SCORE 3/1/2016 9/6/2016 5/8/2017   Total Score - - -   Total Score 5 6 8       Alcoholism in remission - Started using Etoh age 12. Heavy since age 14. Quit at age 28 then back at age 49. Binge several days at a time. Quart of rum per day. Inpatient treatment in 2012. Currently in remission. Last drink was mid sept 2014.    Tobacco abuse - Smoked 1 ppd in his 20's for about 2 years. Chews tobacco 1 in 1 week. Counseled. He will try to quit.    Left shoulder pain - MRI 10/3/12 showed 50% supraspinatous tear. He has seen Dr. Garibay. He was referred to PT. Doing ok now.     Dyslipidemia - CV risk 10.4. I explained statins are recommended at 7.5% or greater. We decided to try diet and exercise. If not helpful, then start statin.    The 10-year ASCVD risk score (Garland ORDOÑEZ Jr., et al., 2013) is: 10.4%    Values used to calculate the score:      Age: 55 years      Sex: Male      Is an : No      Diabetic: No       Tobacco smoker: No      Systolic Blood Pressure: 142 mmHg      Prescribed Antihypertensives: Yes      HDL Cholesterol: 50 mg/dL      Total Cholesterol: 222 mg/dL      Recent Labs   Lab Test  02/26/16   0908  08/27/14   1121  11/09/12   1040   CHOL  222*  200*  191   HDL  50  51  42   LDL  141*  112  132*   TRIG  156*  186*  89   CHOLHDLRATIO   --   3.9  4.6       BPH - symptoms are chronic frequency, hesitation, nocturia.   Treatment:   Previously on Flomax.    Now on Cardura 4 mg qd. It seems to be effective without side effects. If he misses a couple of days, he notices the urinary symptoms.    Enumerable Moles - there is one on the low back that is 6 mm. I had asked him to come back for removal but he did not. Also over the last couple of years he has had a lesion on his left temple. It seems to get irritated.  Treatment:   Referred previously to dermatology.     Preventive:    Flu shot: declined previously      Immunization History   Administered Date(s) Administered     Influenza (IIV3) 10/21/2011, 09/27/2012     Influenza Vaccine IM 3yrs+ 4 Valent IIV4 11/04/2016     TDAP Vaccine (Boostrix) 09/27/2012       STD screen: declines    Colonoscopy: 3/28/13 - repeat in 5 years    Prostate CA screen: Discussed controversy about screening.     PSA   Date Value Ref Range Status   02/26/2016 1.15 0 - 4 ug/L Final     Hep C screen: negative 2/26/16    Advanced Directive: referred previously    Lung cancer screen: does not qualify.    ROS:    Const: No fevers, weight changes or night sweats recently.  ENT: No runny nose, sore throat or ear pain.  Resp: No cough or shortness of breath.  CV: No chest pain, dizziness or cardiac palpitations.  GI: No nausea, vomiting, diarrhea or constipation. Denies blood in stools or black stools.  : No dysuria, frequency or hematuria.    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 During summer, bike  repair.  Exercise: Exercise about 100 mile per week biking during nice weather.  Tobacco: per HPI  Etoh: per HPI  Recreational drugs: no  Caffeine: coffee several per day    FH:    Mother had rheumatic fever and dialysis and HTN.    Exam:    /80 (Cuff Size: Adult Large)  Pulse 71  Temp 97.4  F (36.3  C) (Oral)  Wt 172 lb (78 kg)  SpO2 98%  BMI 25.4 kg/m2    Gen: Healthy appearing male in no acute distress  Neck: No enlarged lymph nodes, thyromegally or other masses.  Lungs: Good air movement and otherwise clear.  CV: Heart RRR with no murmurs. No JVD, carotid bruits or leg edema.  Psych: Affect is normal. Speech is fluent. Thought logical. Insight and judgement seem to be intact. Denies SI or HI.  MS: tender on the proximal right forearm.     Assessment and Plan - Decision Making    1. Major depressive disorder, recurrent episode, mild (H)  Not well controlled. I think it is difficult to improve on this without counseling. He was previously referred.  - DEPRESSION ACTION PLAN (DAP)  - buPROPion (WELLBUTRIN XL) 300 MG 24 hr tablet; Take 1 tablet (300 mg) by mouth every morning  Dispense: 90 tablet; Refill: 1    2. Generalized anxiety disorder  Same as above.  - buPROPion (WELLBUTRIN XL) 300 MG 24 hr tablet; Take 1 tablet (300 mg) by mouth every morning  Dispense: 90 tablet; Refill: 1    3. Benign non-nodular prostatic hyperplasia with lower urinary tract symptoms  Well treated. Continue Cardura.  - doxazosin (CARDURA) 4 MG tablet; Take 1 tablet (4 mg) by mouth At Bedtime  Dispense: 90 tablet; Refill: 1    4. Benign essential hypertension  Well controlled. Continue same treatment.  - Basic metabolic panel; Future  - lisinopril (PRINIVIL/ZESTRIL) 10 MG tablet; Take 1 tablet (10 mg) by mouth daily  Dispense: 90 tablet; Refill: 1    5. Pain of right upper arm  Refer to ortho.  - ORTHOPEDICS ADULT REFERRAL    6. Dyslipidemia  Check fasting labs and go from there.  - Lipid panel reflex to direct LDL; Future  -  ALT; Future    7. Moderate persistent asthma without complication  Not well controlled due to allergy season. But I believe he is maximized on therapy. Consider referral to asthma specialist.  - Asthma Action Plan (AAP)      Written instructions given as follows:    Patient Instructions   1. Give fasting blood sample on the next appointment.    2. Make an appointment with orthopedics for the right arm pain - call [phone] or stop by the .    3. If all is well, see you back in 6 months.

## 2017-05-08 NOTE — NURSING NOTE
"Chief Complaint   Patient presents with     Musculoskeletal Problem     Hypertension     Prostate Problem       Initial BP (!) 165/91 (Cuff Size: Adult Large)  Pulse 71  Temp 97.4  F (36.3  C) (Oral)  Wt 172 lb (78 kg)  SpO2 98%  BMI 25.4 kg/m2 Estimated body mass index is 25.4 kg/(m^2) as calculated from the following:    Height as of 2/6/17: 5' 9\" (1.753 m).    Weight as of this encounter: 172 lb (78 kg).  Medication Reconciliation: complete    Kiki Araujo LPN    "

## 2017-05-08 NOTE — PATIENT INSTRUCTIONS
1. Give fasting blood sample on the next appointment.    2. Make an appointment with orthopedics for the right arm pain - call [phone] or stop by the .    3. If all is well, see you back in 6 months.

## 2017-05-09 ASSESSMENT — ASTHMA QUESTIONNAIRES: ACT_TOTALSCORE: 15

## 2017-05-09 ASSESSMENT — PATIENT HEALTH QUESTIONNAIRE - PHQ9: SUM OF ALL RESPONSES TO PHQ QUESTIONS 1-9: 9

## 2017-05-09 ASSESSMENT — ANXIETY QUESTIONNAIRES: GAD7 TOTAL SCORE: 8

## 2017-05-15 ENCOUNTER — TELEPHONE (OUTPATIENT)
Dept: FAMILY MEDICINE | Facility: CLINIC | Age: 57
End: 2017-05-15

## 2017-05-15 NOTE — LETTER
[facility]  [address] [facility] [address] [phone]    May 30, 2017      Marin Mcneil  [address]  [place] [address]      Dear Marin,     Your clinic record indicates that you are due for an asthma update. We have a survey tool called an ACT (or Asthma Control Test) we use to measure the level of control of your asthma. Please complete the enclosed questionnaire and mail it back to us in the self-addressed stamped envelope.     If you have questions about this letter please contact your provider.     Sincerely,       Your [facility] Team

## 2017-05-15 NOTE — TELEPHONE ENCOUNTER
Panel Management Review      Patient has the following on his problem list:     Hypertension   Last three blood pressure readings:  BP Readings from Last 3 Encounters:   05/08/17 128/80   02/06/17 (!) 180/94   11/04/16 144/80     Blood pressure: Passed    HTN Guidelines:  Age 18-59 BP range:  Less than 140/90  Age 60-85 with Diabetes:  Less than 140/90  Age 60-85 without Diabetes:  less than 150/90      Composite cancer screening  Chart review shows that this patient is due/due soon for the following None  Summary:    Patient is due/failing the following:   none    Action needed:   none    Type of outreach:    none    Questions for provider review:    None                                                                                                                                    Maria Eugenia Berry M.A.       Chart routed to n/a .

## 2017-05-15 NOTE — TELEPHONE ENCOUNTER
Patient was in to see Dr. Torrez on 05-08-17 and failed their ACT by scoring 15. Please put in the failed ACT workflow for follow-up. Thank you.

## 2017-05-16 ENCOUNTER — OFFICE VISIT (OUTPATIENT)
Dept: ORTHOPEDICS | Facility: CLINIC | Age: 57
End: 2017-05-16
Payer: MEDICAID

## 2017-05-16 VITALS — TEMPERATURE: 98 F | WEIGHT: 172 LBS | HEIGHT: 69 IN | BODY MASS INDEX: 25.48 KG/M2

## 2017-05-16 DIAGNOSIS — M75.41 IMPINGEMENT SYNDROME OF RIGHT SHOULDER: Primary | ICD-10-CM

## 2017-05-16 DIAGNOSIS — M77.11 RIGHT TENNIS ELBOW: ICD-10-CM

## 2017-05-16 PROCEDURE — 99243 OFF/OP CNSLTJ NEW/EST LOW 30: CPT | Mod: 25 | Performed by: ORTHOPAEDIC SURGERY

## 2017-05-16 PROCEDURE — 20610 DRAIN/INJ JOINT/BURSA W/O US: CPT | Mod: RT | Performed by: ORTHOPAEDIC SURGERY

## 2017-05-16 RX ORDER — BETAMETHASONE SODIUM PHOSPHATE AND BETAMETHASONE ACETATE 3; 3 MG/ML; MG/ML
12 INJECTION, SUSPENSION INTRA-ARTICULAR; INTRALESIONAL; INTRAMUSCULAR; SOFT TISSUE ONCE
Qty: 2 ML | Refills: 0
Start: 2017-05-16 | End: 2017-07-26

## 2017-05-16 ASSESSMENT — PAIN SCALES - GENERAL: PAINLEVEL: EXTREME PAIN (9)

## 2017-05-16 NOTE — NURSING NOTE
"Chief Complaint   Patient presents with     Consult       Initial Temp 98  F (36.7  C) (Temporal)  Ht 1.753 m (5' 9\")  Wt 78 kg (172 lb)  BMI 25.4 kg/m2 Estimated body mass index is 25.4 kg/(m^2) as calculated from the following:    Height as of this encounter: 1.753 m (5' 9\").    Weight as of this encounter: 78 kg (172 lb).  Medication Reconciliation: complete   BOB Luz  "

## 2017-05-16 NOTE — MR AVS SNAPSHOT
After Visit Summary   5/16/2017    Marin Mcneil    MRN: [id number]           Patient Information     Date Of Birth          1960        Visit Information        Provider Department      5/16/2017 11:10 AM Ric Liz MD [facility]        Today's Diagnoses     Impingement syndrome of right shoulder    -  1    Right tennis elbow           Follow-ups after your visit        Your next 10 appointments already scheduled     May 30, 2017 11:10 AM CDT   Return Visit with Ric Liz MD   [facility] ([facility])    [address] [place] [address] [id number] [phone]              Who to contact     If you have questions or need follow up information about today's clinic visit or your schedule please contact [facility] directly at [phone].  Normal or non-critical lab and imaging results will be communicated to you by Brainlikehart, letter or phone within 4 business days after the clinic has received the results. If you do not hear from us within 7 days, please contact the clinic through Brainlikehart or phone. If you have a critical or abnormal lab result, we will notify you by phone as soon as possible.  Submit refill requests through dscovered or call your pharmacy and they will forward the refill request to us. Please allow 3 business days for your refill to be completed.          Additional Information About Your Visit        BrainlikeharBEETmobile Information     dscovered gives you secure access to your electronic health record. If you see a primary care provider, you can also send messages to your care team and make appointments. If you have questions, please call your primary care clinic.  If you do not have a primary care provider, please call [phone] and they will assist you.        Care EveryWhere ID     This is your Care EveryWhere ID. This could be used by other organizations to access your [facility]  "records  [id number]        Your Vitals Were     Temperature Height BMI (Body Mass Index)             98  F (36.7  C) (Temporal) 1.753 m (5' 9\") 25.4 kg/m2          Blood Pressure from Last 3 Encounters:   05/08/17 128/80   02/06/17 (!) 180/94   11/04/16 144/80    Weight from Last 3 Encounters:   05/16/17 78 kg (172 lb)   05/08/17 78 kg (172 lb)   02/06/17 81.2 kg (179 lb)              We Performed the Following     Celestone 12 mg Inj. []     Large Joint/Bursa injection and/or drainage (Shoulder, Knee)          Today's Medication Changes          These changes are accurate as of: 5/16/17  1:48 PM.  If you have any questions, ask your nurse or doctor.               Start taking these medicines.        Dose/Directions    betamethasone acet & sod phos 6 (3-3) MG/ML Susp injection   Commonly known as:  CELESTONE   Used for:  Impingement syndrome of right shoulder   Started by:  Ric Liz MD        Dose:  12 mg   2 mLs (12 mg) by INTRA-ARTICULAR route once for 1 dose   Quantity:  2 mL   Refills:  0            Where to get your medicines      Some of these will need a paper prescription and others can be bought over the counter.  Ask your nurse if you have questions.     You don't need a prescription for these medications     betamethasone acet & sod phos 6 (3-3) MG/ML Susp injection                Primary Care Provider Office Phone # Fax #    Patel Torrez [phone]       [facility] [address] [facility] [address]        Thank you!     Thank you for choosing [facility]  for your care. Our goal is always to provide you with excellent care. Hearing back from our patients is one way we can continue to improve our services. Please take a few minutes to complete the written survey that you may receive in the mail after your visit with us. Thank you!             Your Updated Medication List - Protect others around you: Learn how to safely use, store and " throw away your medicines at www.disposemymeds.org.          This list is accurate as of: 5/16/17  1:48 PM.  Always use your most recent med list.                   Brand Name Dispense Instructions for use    albuterol 108 (90 BASE) MCG/ACT Inhaler    PROAIR HFA/PROVENTIL HFA/VENTOLIN HFA    1 Inhaler    Inhale 2 puffs into the lungs every 4 hours as needed for shortness of breath / dyspnea       atorvastatin 40 MG tablet    LIPITOR    90 tablet    Take 1 tablet (40 mg) by mouth daily       azelastine 0.05 % Soln ophthalmic solution    OPTIVAR    1 Bottle    Apply 1 drop to eye 2 times daily       betamethasone acet & sod phos 6 (3-3) MG/ML Susp injection    CELESTONE    2 mL    2 mLs (12 mg) by INTRA-ARTICULAR route once for 1 dose       budesonide-formoterol 80-4.5 MCG/ACT Inhaler    SYMBICORT    1 Inhaler    Inhale 2 puffs into the lungs 2 times daily       buPROPion 300 MG 24 hr tablet    WELLBUTRIN XL    90 tablet    Take 1 tablet (300 mg) by mouth every morning       cetirizine 10 MG tablet    zyrTEC    90 tablet    Take 1 tablet (10 mg) by mouth every evening       doxazosin 4 MG tablet    CARDURA    90 tablet    Take 1 tablet (4 mg) by mouth At Bedtime       fluticasone 50 MCG/ACT spray    FLONASE    1 Bottle    Spray 1-2 sprays into both nostrils daily       gabapentin 300 MG capsule    NEURONTIN    360 capsule    Take 2 capsules (600 mg) by mouth 2 times daily       lisinopril 10 MG tablet    PRINIVIL/ZESTRIL    90 tablet    Take 1 tablet (10 mg) by mouth daily       methocarbamol 500 MG tablet    ROBAXIN    60 tablet    Take 1 tablet (500 mg) by mouth 2 times daily as needed for muscle spasms       mometasone-formoterol 100-5 MCG/ACT oral inhaler    DULERA    13 g    Inhale 2 puffs into the lungs 2 times daily       montelukast 10 MG tablet    SINGULAIR    90 tablet    Take 1 tablet (10 mg) by mouth At Bedtime       olopatadine 0.1 % ophthalmic solution    PATANOL    5 mL    Place 1 drop into both eyes 2  times daily       omeprazole 20 MG CR capsule    priLOSEC    90 capsule    Take 1 capsule (20 mg) by mouth daily
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
## 2017-06-01 ENCOUNTER — OFFICE VISIT (OUTPATIENT)
Dept: ORTHOPEDICS | Facility: CLINIC | Age: 57
End: 2017-06-01
Payer: MEDICAID

## 2017-06-01 VITALS — WEIGHT: 172 LBS | HEART RATE: 66 BPM | BODY MASS INDEX: 25.48 KG/M2 | HEIGHT: 69 IN

## 2017-06-01 DIAGNOSIS — M77.11 RIGHT TENNIS ELBOW: ICD-10-CM

## 2017-06-01 DIAGNOSIS — M75.41 IMPINGEMENT SYNDROME OF RIGHT SHOULDER: Primary | ICD-10-CM

## 2017-06-01 PROCEDURE — 99213 OFFICE O/P EST LOW 20 MIN: CPT | Performed by: ORTHOPAEDIC SURGERY

## 2017-06-01 ASSESSMENT — PAIN SCALES - GENERAL: PAINLEVEL: SEVERE PAIN (7)
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
## 2017-06-01 NOTE — MR AVS SNAPSHOT
"              After Visit Summary   6/1/2017    Marin Mcneil    MRN: [id number]           Patient Information     Date Of Birth          1960        Visit Information        Provider Department      6/1/2017 11:10 AM Ric Liz MD [facility]        Today's Diagnoses     Impingement syndrome of right shoulder    -  1    Right tennis elbow           Follow-ups after your visit        Additional Services     PHYSICAL THERAPY REFERRAL       *This therapy referral will be filtered to a centralized scheduling office at [facility] and the patient will receive a call to schedule an appointment at a [place] location most convenient for them. *   PRN  [facility] provides Physical Therapy evaluation and treatment and many specialty services across the [place] system.  If requesting a specialty program, please choose from the list below.    If you have not heard from the scheduling office within 2 business days, please call [phone] for all locations, with the exception of [place], please call [phone].  Treatment: Evaluation & Treatment  Special Instructions/Modalities: PRN  Special Programs: shoulder impingement program.    Please be aware that coverage of these services is subject to the terms and limitations of your health insurance plan.  Call member services at your health plan with any benefit or coverage questions.      **Note to Provider:  If you are referring outside of [place] for the therapy appointment, please list the name of the location in the \"special instructions\" above, print the referral and give to the patient to schedule the appointment.                  Who to contact     If you have questions or need follow up information about today's clinic visit or your schedule please contact [facility] directly at [phone].  Normal or non-critical lab and imaging results will be communicated to you by Shane, " "letter or phone within 4 business days after the clinic has received the results. If you do not hear from us within 7 days, please contact the clinic through [employer] or phone. If you have a critical or abnormal lab result, we will notify you by phone as soon as possible.  Submit refill requests through [employer] or call your pharmacy and they will forward the refill request to us. Please allow 3 business days for your refill to be completed.          Additional Information About Your Visit        [facility] Information     [employer] gives you secure access to your electronic health record. If you see a primary care provider, you can also send messages to your care team and make appointments. If you have questions, please call your primary care clinic.  If you do not have a primary care provider, please call [phone] and they will assist you.        Care EveryWhere ID     This is your Care EveryWhere ID. This could be used by other organizations to access your [facility] medical records  [id number]        Your Vitals Were     Pulse Height BMI (Body Mass Index)             66 1.753 m (5' 9\") 25.4 kg/m2          Blood Pressure from Last 3 Encounters:   05/08/17 128/80   02/06/17 (!) 180/94   11/04/16 144/80    Weight from Last 3 Encounters:   06/01/17 78 kg (172 lb)   05/16/17 78 kg (172 lb)   05/08/17 78 kg (172 lb)              We Performed the Following     PHYSICAL THERAPY REFERRAL        Primary Care Provider Office Phone # Fax #    Patel Torrez [phone]       [facility] [address] [address] [address] [facility] [address]        Thank you!     Thank you for choosing [facility]  for your care. Our goal is always to provide you with excellent care. Hearing back from our patients is one way we can continue to improve our services. Please take a few minutes to complete the written survey that you may receive in the mail after your visit with us. Thank you!             Your Updated " Medication List - Protect others around you: Learn how to safely use, store and throw away your medicines at www.disposemymeds.org.          This list is accurate as of: 6/1/17 11:37 AM.  Always use your most recent med list.                   Brand Name Dispense Instructions for use    albuterol 108 (90 BASE) MCG/ACT Inhaler    PROAIR HFA/PROVENTIL HFA/VENTOLIN HFA    1 Inhaler    Inhale 2 puffs into the lungs every 4 hours as needed for shortness of breath / dyspnea       atorvastatin 40 MG tablet    LIPITOR    90 tablet    Take 1 tablet (40 mg) by mouth daily       azelastine 0.05 % Soln ophthalmic solution    OPTIVAR    1 Bottle    Apply 1 drop to eye 2 times daily       betamethasone acet & sod phos 6 (3-3) MG/ML Susp injection    CELESTONE    2 mL    2 mLs (12 mg) by INTRA-ARTICULAR route once for 1 dose       budesonide-formoterol 80-4.5 MCG/ACT Inhaler    SYMBICORT    1 Inhaler    Inhale 2 puffs into the lungs 2 times daily       buPROPion 300 MG 24 hr tablet    WELLBUTRIN XL    90 tablet    Take 1 tablet (300 mg) by mouth every morning       cetirizine 10 MG tablet    zyrTEC    90 tablet    Take 1 tablet (10 mg) by mouth every evening       doxazosin 4 MG tablet    CARDURA    90 tablet    Take 1 tablet (4 mg) by mouth At Bedtime       fluticasone 50 MCG/ACT spray    FLONASE    1 Bottle    Spray 1-2 sprays into both nostrils daily       gabapentin 300 MG capsule    NEURONTIN    360 capsule    Take 2 capsules (600 mg) by mouth 2 times daily       lisinopril 10 MG tablet    PRINIVIL/ZESTRIL    90 tablet    Take 1 tablet (10 mg) by mouth daily       methocarbamol 500 MG tablet    ROBAXIN    60 tablet    Take 1 tablet (500 mg) by mouth 2 times daily as needed for muscle spasms       mometasone-formoterol 100-5 MCG/ACT oral inhaler    DULERA    13 g    Inhale 2 puffs into the lungs 2 times daily       montelukast 10 MG tablet    SINGULAIR    90 tablet    Take 1 tablet (10 mg) by mouth At Bedtime       olopatadine  0.1 % ophthalmic solution    PATANOL    5 mL    Place 1 drop into both eyes 2 times daily       omeprazole 20 MG CR capsule    priLOSEC    90 capsule    Take 1 capsule (20 mg) by mouth daily

## 2017-06-07 DIAGNOSIS — J45.40 MODERATE PERSISTENT ASTHMA WITHOUT COMPLICATION: ICD-10-CM

## 2017-06-07 RX ORDER — DILTIAZEM HYDROCHLORIDE 60 MG/1
TABLET, FILM COATED ORAL
Qty: 10.2 G | Refills: 3 | Status: SHIPPED | OUTPATIENT
Start: 2017-06-07 | End: 2017-11-20

## 2017-06-07 NOTE — TELEPHONE ENCOUNTER
Last ACT less than 20 in May.  Routing to provider to advise.    Tegan Bustos, RN    ACT Total Scores 3/1/2016 9/6/2016 5/8/2017   ACT TOTAL SCORE - - -   ASTHMA ER VISITS - - -   ASTHMA HOSPITALIZATIONS - - -   ACT TOTAL SCORE (Goal Greater than or Equal to 20) 19 15 15   In the past 12 months, how many times did you visit the emergency room for your asthma without being admitted to the hospital? 0 0 0   In the past 12 months, how many times were you hospitalized overnight because of your asthma? 0 0 0

## 2017-07-26 ENCOUNTER — OFFICE VISIT (OUTPATIENT)
Dept: FAMILY MEDICINE | Facility: CLINIC | Age: 57
End: 2017-07-26
Payer: COMMERCIAL

## 2017-07-26 VITALS
DIASTOLIC BLOOD PRESSURE: 80 MMHG | SYSTOLIC BLOOD PRESSURE: 120 MMHG | TEMPERATURE: 98.4 F | HEART RATE: 61 BPM | WEIGHT: 169 LBS | BODY MASS INDEX: 24.96 KG/M2 | OXYGEN SATURATION: 98 %

## 2017-07-26 DIAGNOSIS — S16.1XXD CERVICAL STRAIN, SUBSEQUENT ENCOUNTER: ICD-10-CM

## 2017-07-26 DIAGNOSIS — M54.2 CHRONIC NECK PAIN: ICD-10-CM

## 2017-07-26 DIAGNOSIS — G89.29 CHRONIC NECK PAIN: ICD-10-CM

## 2017-07-26 DIAGNOSIS — M54.12 RIGHT CERVICAL RADICULOPATHY: ICD-10-CM
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
## 2017-07-26 PROCEDURE — 99214 OFFICE O/P EST MOD 30 MIN: CPT | Performed by: FAMILY MEDICINE

## 2017-07-26 RX ORDER — OXYCODONE AND ACETAMINOPHEN 5; 325 MG/1; MG/1
1 TABLET ORAL 2 TIMES DAILY PRN
Qty: 60 TABLET | Refills: 0 | Status: SHIPPED | OUTPATIENT
Start: 2017-08-26 | End: 2017-10-30

## 2017-07-26 RX ORDER — OXYCODONE AND ACETAMINOPHEN 5; 325 MG/1; MG/1
1 TABLET ORAL 2 TIMES DAILY PRN
Qty: 60 TABLET | Refills: 0 | Status: SHIPPED | OUTPATIENT
Start: 2017-07-26 | End: 2017-10-30

## 2017-07-26 RX ORDER — OXYCODONE AND ACETAMINOPHEN 5; 325 MG/1; MG/1
1 TABLET ORAL 2 TIMES DAILY PRN
Qty: 60 TABLET | Refills: 0 | Status: SHIPPED | OUTPATIENT
Start: 2017-09-26 | End: 2017-10-30

## 2017-07-26 RX ORDER — METHOCARBAMOL 500 MG/1
500 TABLET, FILM COATED ORAL 2 TIMES DAILY PRN
Qty: 180 TABLET | Refills: 3 | Status: SHIPPED | OUTPATIENT
Start: 2017-07-26 | End: 2018-08-09
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
## 2017-07-26 NOTE — PATIENT INSTRUCTIONS
1. I have refilled your Percocet - no driving within 4 hours of taking this. Do not mix with alcohol or other sedatives.    2. I have refilled your Robaxin - no driving within 4 hours of taking this. Do not mix with alcohol or other sedatives.    3. You should have enough Neurontin (Gabapentin) at the pharmacy.    4. Set up the neck MRI - [phone]. I will contact you with results.
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
## 2017-07-27 ENCOUNTER — OFFICE VISIT (OUTPATIENT)
Dept: FAMILY MEDICINE | Facility: CLINIC | Age: 57
End: 2017-07-27
Payer: MEDICAID

## 2017-07-27 VITALS
DIASTOLIC BLOOD PRESSURE: 78 MMHG | TEMPERATURE: 97.1 F | WEIGHT: 170 LBS | OXYGEN SATURATION: 98 % | BODY MASS INDEX: 25.1 KG/M2 | HEART RATE: 62 BPM | SYSTOLIC BLOOD PRESSURE: 126 MMHG

## 2017-07-27 DIAGNOSIS — F33.0 MAJOR DEPRESSIVE DISORDER, RECURRENT EPISODE, MILD (H): ICD-10-CM

## 2017-07-27 DIAGNOSIS — E78.5 DYSLIPIDEMIA: ICD-10-CM

## 2017-07-27 DIAGNOSIS — Z72.0 TOBACCO ABUSE: ICD-10-CM

## 2017-07-27 DIAGNOSIS — I10 ESSENTIAL HYPERTENSION WITH GOAL BLOOD PRESSURE LESS THAN 140/90: Primary | ICD-10-CM

## 2017-07-27 LAB
ALT SERPL W P-5'-P-CCNC: 26 U/L (ref 0–70)
ANION GAP SERPL CALCULATED.3IONS-SCNC: 4 MMOL/L (ref 3–14)
BUN SERPL-MCNC: 23 MG/DL (ref 7–30)
CALCIUM SERPL-MCNC: 9.5 MG/DL (ref 8.5–10.1)
CHLORIDE SERPL-SCNC: 105 MMOL/L (ref 94–109)
CHOLEST SERPL-MCNC: 140 MG/DL
CO2 SERPL-SCNC: 31 MMOL/L (ref 20–32)
CREAT SERPL-MCNC: 0.9 MG/DL (ref 0.66–1.25)
GFR SERPL CREATININE-BSD FRML MDRD: 86 ML/MIN/1.7M2
GLUCOSE SERPL-MCNC: 96 MG/DL (ref 70–99)
HDLC SERPL-MCNC: 42 MG/DL
LDLC SERPL CALC-MCNC: 87 MG/DL
NONHDLC SERPL-MCNC: 98 MG/DL
POTASSIUM SERPL-SCNC: 4.7 MMOL/L (ref 3.4–5.3)
SODIUM SERPL-SCNC: 140 MMOL/L (ref 133–144)
TRIGL SERPL-MCNC: 55 MG/DL

## 2017-07-27 PROCEDURE — 80061 LIPID PANEL: CPT | Performed by: FAMILY MEDICINE

## 2017-07-27 PROCEDURE — 84460 ALANINE AMINO (ALT) (SGPT): CPT | Performed by: FAMILY MEDICINE

## 2017-07-27 PROCEDURE — 99214 OFFICE O/P EST MOD 30 MIN: CPT | Performed by: FAMILY MEDICINE

## 2017-07-27 PROCEDURE — 36415 COLL VENOUS BLD VENIPUNCTURE: CPT | Performed by: FAMILY MEDICINE

## 2017-07-27 PROCEDURE — 80048 BASIC METABOLIC PNL TOTAL CA: CPT | Performed by: FAMILY MEDICINE

## 2017-07-27 RX ORDER — BUPROPION HYDROCHLORIDE 150 MG/1
150 TABLET ORAL EVERY MORNING
Qty: 10 TABLET | Refills: 1 | Status: SHIPPED | OUTPATIENT
Start: 2017-07-27 | End: 2017-10-14

## 2017-07-27 NOTE — MR AVS SNAPSHOT
After Visit Summary   7/27/2017    Marin Mcneil    MRN: [id number]           Patient Information     Date Of Birth          1960        Visit Information        Provider Department      7/27/2017 11:30 AM Patel Torrez MD [facility]        Today's Diagnoses     Major depressive disorder, recurrent episode, mild (H)    -  1    Dyslipidemia        Essential hypertension with goal blood pressure less than 140/90          Care Instructions    1. Reduce the Wellbutrin to 150 mg daily for 10 days then stop. Let me know if your mood becomes a problem.    2. If all is well, see you back in 6 months.                Follow-ups after your visit        Future tests that were ordered for you today     Open Future Orders        Priority Expected Expires Ordered    MR Cervical Spine w/o Contrast Routine  9/9/2017 7/26/2017            Who to contact     If you have questions or need follow up information about today's clinic visit or your schedule please contact [facility] directly at [phone].  Normal or non-critical lab and imaging results will be communicated to you by [employer], letter or phone within 4 business days after the clinic has received the results. If you do not hear from us within 7 days, please contact the clinic through [employer] or phone. If you have a critical or abnormal lab result, we will notify you by phone as soon as possible.  Submit refill requests through [employer] or call your pharmacy and they will forward the refill request to us. Please allow 3 business days for your refill to be completed.          Additional Information About Your Visit        [employer] Information     [employer] gives you secure access to your electronic health record. If you see a primary care provider, you can also send messages to your care team and make appointments. If you have questions, please call your primary care clinic.  If you do not have a primary care provider, please  call [phone] and they will assist you.        Care EveryWhere ID     This is your Care EveryWhere ID. This could be used by other organizations to access your [facility] medical records  [id number]        Your Vitals Were     Pulse Temperature Pulse Oximetry BMI (Body Mass Index)          62 97.1  F (36.2  C) (Oral) 98% 25.1 kg/m2         Blood Pressure from Last 3 Encounters:   07/27/17 126/78   07/26/17 120/80   05/08/17 128/80    Weight from Last 3 Encounters:   07/27/17 170 lb (77.1 kg)   07/26/17 169 lb (76.7 kg)   06/01/17 172 lb (78 kg)              We Performed the Following     ALT     Basic metabolic panel     Lipid panel reflex to direct LDL          Today's Medication Changes          These changes are accurate as of: 7/27/17 12:11 PM.  If you have any questions, ask your nurse or doctor.               These medicines have changed or have updated prescriptions.        Dose/Directions    buPROPion 150 MG 24 hr tablet   Commonly known as:  WELLBUTRIN XL   This may have changed:    - medication strength  - how much to take   Used for:  Major depressive disorder, recurrent episode, mild (H)   Changed by:  Patel Torrez MD        Dose:  150 mg   Take 1 tablet (150 mg) by mouth every morning   Quantity:  10 tablet   Refills:  1            Where to get your medicines      These medications were sent to [facility] [address], [address]  [address] [facility], [address] [facility], [place] [id number]     Phone:  [phone]     buPROPion 150 MG 24 hr tablet                Primary Care Provider Office Phone # Fax #    Patel Torrez [phone]       [facility] [address] [facility] [address]        Equal Access to Services     BRIDGET OWEN AH: Jose Rafael Borja, wadaniela poe, qamikal kaalmada reena, jaimie ernst. So [facility] [phone].    ATENCIÓN: Si habla español, tiene a kirk disposición servicios  atiya de asistencia lingüística. Hugo burdick [phone].    We comply with applicable federal civil rights laws and [place] laws. We do not discriminate on the basis of race, color, national origin, age, disability sex, sexual orientation or gender identity.            Thank you!     Thank you for [facility]  [facility]. Our goal is always to provide you with excellent care. Hearing back from our patients is one way we can continue to improve our services. Please take a few minutes to complete the written survey that you may receive in the mail after your visit with us. Thank you!             Your Updated Medication List - Protect others around you: Learn how to safely use, store and throw away your medicines at www.disposemymeds.org.          This list is accurate as of: 7/27/17 12:11 PM.  Always use your most recent med list.                   Brand Name Dispense Instructions for use Diagnosis    albuterol 108 (90 BASE) MCG/ACT Inhaler    PROAIR HFA/PROVENTIL HFA/VENTOLIN HFA    1 Inhaler    Inhale 2 puffs into the lungs every 4 hours as needed for shortness of breath / dyspnea    Moderate persistent asthma without complication       atorvastatin 40 MG tablet    LIPITOR    90 tablet    Take 1 tablet (40 mg) by mouth daily    High blood cholesterol       azelastine 0.05 % Soln ophthalmic solution    OPTIVAR    1 Bottle    Apply 1 drop to eye 2 times daily    Seasonal allergies       buPROPion 150 MG 24 hr tablet    WELLBUTRIN XL    10 tablet    Take 1 tablet (150 mg) by mouth every morning    Major depressive disorder, recurrent episode, mild (H)       cetirizine 10 MG tablet    zyrTEC    90 tablet    Take 1 tablet (10 mg) by mouth every evening    Environmental allergies       doxazosin 4 MG tablet    CARDURA    90 tablet    Take 1 tablet (4 mg) by mouth At Bedtime    Benign non-nodular prostatic hyperplasia with lower urinary tract symptoms       fluticasone 50 MCG/ACT spray     FLONASE    1 Bottle    Spray 1-2 sprays into both nostrils daily    Seasonal allergies       gabapentin 300 MG capsule    NEURONTIN    360 capsule    Take 2 capsules (600 mg) by mouth 2 times daily    Chronic neck pain       lisinopril 10 MG tablet    PRINIVIL/ZESTRIL    90 tablet    Take 1 tablet (10 mg) by mouth daily    Essential hypertension with goal blood pressure less than 140/90       methocarbamol 500 MG tablet    ROBAXIN    180 tablet    Take 1 tablet (500 mg) by mouth 2 times daily as needed for muscle spasms    Cervical strain, subsequent encounter, Chronic neck pain       mometasone-formoterol 100-5 MCG/ACT oral inhaler    DULERA    13 g    Inhale 2 puffs into the lungs 2 times daily    Moderate persistent asthma without complication       montelukast 10 MG tablet    SINGULAIR    90 tablet    Take 1 tablet (10 mg) by mouth At Bedtime    Environmental allergies       olopatadine 0.1 % ophthalmic solution    PATANOL    5 mL    Place 1 drop into both eyes 2 times daily    Seasonal allergies       omeprazole 20 MG CR capsule    priLOSEC    90 capsule    Take 1 capsule (20 mg) by mouth daily    Gastroesophageal reflux disease, esophagitis presence not specified       * oxyCODONE-acetaminophen 5-325 MG per tablet    PERCOCET    60 tablet    Take 1 tablet by mouth 2 times daily as needed for moderate to severe pain    Chronic neck pain, Cervical strain, subsequent encounter       * oxyCODONE-acetaminophen 5-325 MG per tablet   Start taking on:  8/26/2017    PERCOCET    60 tablet    Take 1 tablet by mouth 2 times daily as needed for moderate to severe pain    Cervical strain, subsequent encounter, Right cervical radiculopathy       * oxyCODONE-acetaminophen 5-325 MG per tablet   Start taking on:  9/26/2017    PERCOCET    60 tablet    Take 1 tablet by mouth 2 times daily as needed for moderate to severe pain    Chronic neck pain, Cervical strain, subsequent encounter       SYMBICORT 80-4.5 MCG/ACT Inhaler    Generic drug:  budesonide-formoterol     10.2 g    INHALE TWO PUFFS BY MOUTH TWICE A DAY    Moderate persistent asthma without complication       * Notice:  This list has 3 medication(s) that are the same as other medications prescribed for you. Read the directions carefully, and ask your doctor or other care provider to review them with you.

## 2017-07-27 NOTE — NURSING NOTE
"Chief Complaint   Patient presents with     Shoulder right       Initial /78 (Cuff Size: Adult Regular)  Pulse 62  Temp 97.1  F (36.2  C) (Oral)  Wt 170 lb (77.1 kg)  SpO2 98%  BMI 25.1 kg/m2 Estimated body mass index is 25.1 kg/(m^2) as calculated from the following:    Height as of 6/1/17: 5' 9\" (1.753 m).    Weight as of this encounter: 170 lb (77.1 kg).  Medication Reconciliation: complete    Kiki Araujo LPN    "

## 2017-07-27 NOTE — PATIENT INSTRUCTIONS
1. Reduce the Wellbutrin to 150 mg daily for 10 days then stop. Let me know if your mood becomes a problem.    2. If all is well, see you back in 6 months.
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
## 2017-07-28 PROBLEM — Z72.0 TOBACCO ABUSE: Status: ACTIVE | Noted: 2017-07-28

## 2017-07-28 NOTE — PROGRESS NOTES
Juan Alberto,    All test result were normal.    By the way, I forgot to have you fill out the asthma form when you were here. Do you mind stopping by our pharmacy and filling that out?    Thanks.    Patel Torrez M.D.

## 2017-08-02 ENCOUNTER — HOSPITAL ENCOUNTER (OUTPATIENT)
Dept: MRI IMAGING | Facility: CLINIC | Age: 57
Discharge: HOME | End: 2017-08-02
Attending: FAMILY MEDICINE | Admitting: FAMILY MEDICINE
Payer: MEDICAID

## 2017-08-02 DIAGNOSIS — M54.12 RIGHT CERVICAL RADICULOPATHY: ICD-10-CM

## 2017-08-02 PROCEDURE — 72141 MRI NECK SPINE W/O DYE: CPT

## 2017-08-03 ENCOUNTER — TELEPHONE (OUTPATIENT)
Dept: PALLIATIVE MEDICINE | Facility: CLINIC | Age: 57
End: 2017-08-03

## 2017-08-03 DIAGNOSIS — M54.12 CERVICAL RADICULOPATHY: Primary | ICD-10-CM

## 2017-08-03 NOTE — TELEPHONE ENCOUNTER
Left voicemail for patient to schedule KITA.      Natasha DE LA FUENTE    [facility]

## 2017-08-08 ENCOUNTER — TELEPHONE (OUTPATIENT)
Dept: FAMILY MEDICINE | Facility: CLINIC | Age: 57
End: 2017-08-08

## 2017-08-08 NOTE — TELEPHONE ENCOUNTER
Pre-screening Questions for Radiology Injections:    Injection to be done at which interventional clinic site? [facility]    Procedure ordered by CARLOTTA    Procedure ordered? Cervical Epidural Steroid Injection    What insurance would patient like us to bill for this procedure? [place]      Worker's comp-Any injection DO NOT SCHEDULE and route to Brenda Lee.      [employer] insurance - For SI joint injections, DO NOT SCHEDULE and route Susan Her.      [employer]- MBB's must be scheduled at LEAST two weeks apart      [employer] - Any injection besides hip/shoulder/knee joint DO NOT SCHEDULE and route to Susan Her. She will obtain PA and call pt back to schedule procedure or notify pt of denial.     HP [employer]-PA REQUIRED FOR NON-ENDY OR Joint injections    Any chance of pregnancy? Not Applicable   If YES, do NOT schedule and route to RN pool    Is an  needed? No     Patient has a drive home? (mandatory) YES:     Is patient taking any blood thinners (plavix, coumadin, jantoven, warfarin, heparin, pradaxa or dabigatran )? No   If hold needed, do NOT schedule, route to RN pool     Is patient taking any aspirin products? No     If more than 325mg/day do NOT schedule; route to RN pool     For CERVICAL procedures, hold all aspirin products for 6 days.      Does the patient have a bleeding or clotting disorder? No     If yes, okay to schedule AND route to RN nurse pool    **For any patients with platelet count <100, must be forwarded to provider**    Is patient diabetic?  No  If YES, have them bring their glucometer.    Does patient have an active infection or treated for one within the past week? No     Is patient currently taking any antibiotics?  No     For patients on chronic, preventative, or prophylactic antibiotics, procedures may be scheduled.     For patients on antibiotics for active or recent infection:    Yaw Flood [place], [place]-antibiotic course must have been completed for 4 days    Drs.  Yael-antibiotic course must have been completed for 7 days    Is patient currently taking any steroid medications? (i.e. Prednisone, Medrol)  No     For patients on steroid medications:    Yaw Flood [place], [place]-steroid course must have been completed for 4 days    Palmira [place]-steroid course must have been completed for 7 days    Reviewed with patient:  If you are started on any steroids or antibiotics between now and your appointment, you must contact us because it may affect our ability to perform your procedure.  Yes    Is patient actively being treated for cancer or immunocompromised, including the spleen having been removed? No    If YES, do NOT schedule and route to RN pool     **For Dr. Ayoub patients without spleens should have the chart sent to her**    Are you able to get on and off an exam table with minimal or no assistance? Yes  If NO, do NOT schedule and route to RN pool    Are you able to roll over and lay on your stomach with minimal or no assistance? Yes  If NO, do NOT schedule and route to RN pool     Any allergies to contrast dye, iodine, shellfish, or numbing and steroid medications? No  If YES, route to RN pool AND add allergy information to appointment notes    Allergies: Cats; Dust mites; and Mold        Has the patient had a flu shot or any other vaccinations within 7 days before or after the procedure.  No       Does patient have an MRI/CT?  YES:   (SI joint, hip injections, lumbar sympathetic blocks, and stellate ganglion blocks do not require an MRI)    Was the MRI done w/in the last 3 years?  Yes    Was MRI done at [place]? Yes      If not, where was it done? N/A       If MRI was not done at [place], [facility] or [facility] do NOT schedule and route to nursing.  If pt has an imaging disc, the injection may be scheduled but pt has to bring disc to appt. If they show up w/out disc the injection cannot be done    Reminders (please tell patient if  applicable):       Instructed pt to arrive 30 minutes early for IV start if this is for a cervical procedure, ALL sympathetic (stellate ganglion, hypogastric, or lumbar sympathetic block) and all sedation procedures (RFA, spinal cord stimulation trials).  YES:     -IVs are not routinely placed for [place] and [place] cervical case       If NPO for sedation, informed patient that it is okay to take medications with sips of water (except if they are to hold blood thinners).  Not Applicable   *DO take blood pressure medication if it is prescribed*      If this is for a cervical ENDY, informed patient that aspirin needs to be held for 6 days.   Not Applicable      For all patients not having spinal cord stimulator (SCS) trials or radiofrequency ablations (RFAs), informed patient:  IV sedation is not provided for this procedure.  If you feel that an oral anti-anxiety medication is needed, you can discuss this further with your referring provider or primary care provider.  The Pain Clinic provider will discuss specifics of what the procedure includes at your appointment.  Most procedures last 10-20 minutes.  We use numbing medications to help with any discomfort during the procedure.  Not Applicable      Do not schedule procedures requiring IV placement in the first appointment of the day or first appointment after lunch.       For patients 85 or older we recommend having an adult stay w/ them for the remainder of the day.       Does the patient have any questions?  Not Applicable  Susan Her  [facility] [place]

## 2017-08-08 NOTE — TELEPHONE ENCOUNTER
Pt states drug screen done in July came showed marijuana.  He states he only did a few puffs around [place] and has not touched it since.   Advised last drug screen was in Feb, it was not repeated this summer.   Advised that would explain Feb result.  Advised drug screens done randomly not at every lab.  Pt verbalized understanding.  Dunia Rahman RN

## 2017-08-21 ENCOUNTER — TELEPHONE (OUTPATIENT)
Dept: PSYCHOLOGY | Facility: CLINIC | Age: 57
End: 2017-08-21

## 2017-08-21 DIAGNOSIS — N40.1 BENIGN PROSTATIC HYPERPLASIA WITH LOWER URINARY TRACT SYMPTOMS, SYMPTOM DETAILS UNSPECIFIED: Primary | ICD-10-CM

## 2017-08-21 NOTE — TELEPHONE ENCOUNTER
Called and informed patient of Dr Burr's message. He wanted me to send him a Mychart with the information because he could not write it down at the time.Nataly Bell MA/WENCESLAO

## 2017-08-21 NOTE — TELEPHONE ENCOUNTER
Patient states that the doxazosin 4mg is not working for him.     On behalf of [facility]  Fawn Martins [facility]

## 2017-08-22 ENCOUNTER — TELEPHONE (OUTPATIENT)
Dept: FAMILY MEDICINE | Facility: CLINIC | Age: 57
End: 2017-08-22

## 2017-08-22 DIAGNOSIS — N40.1 BENIGN NON-NODULAR PROSTATIC HYPERPLASIA WITH LOWER URINARY TRACT SYMPTOMS: ICD-10-CM

## 2017-08-22 RX ORDER — DOXAZOSIN 4 MG/1
8 TABLET ORAL AT BEDTIME
Qty: 180 TABLET | Refills: 3 | Status: SHIPPED | OUTPATIENT
Start: 2017-08-22 | End: 2018-04-16

## 2017-08-22 NOTE — TELEPHONE ENCOUNTER
Patient notified.   No further questions or concerns at this time.  Shana Scott RN   [facility]

## 2017-08-22 NOTE — TELEPHONE ENCOUNTER
Patient is asking for  to prescribe Doxasozin 8mg medication, instead of the current 4mg dosage. He would like a call back to verify if this increase in dosage is approved. Call to advise. Thank you

## 2017-08-22 NOTE — TELEPHONE ENCOUNTER
Pt did get information about seeing urologist yesterday.  Pt states the 4 mg was helping a little but not enough. He tried taking 8 mg last night and it worked perfect.  Pt asking if he can try 8 mg daily and if this works avoid seeing the urologist.    To provider to advise.  Dunia Rahman RN

## 2017-09-07 ENCOUNTER — RADIOLOGY INJECTION OFFICE VISIT (OUTPATIENT)
Dept: PALLIATIVE MEDICINE | Facility: CLINIC | Age: 57
End: 2017-09-07
Payer: MEDICAID

## 2017-09-07 ENCOUNTER — RADIANT APPOINTMENT (OUTPATIENT)
Dept: RADIOLOGY | Facility: CLINIC | Age: 57
End: 2017-09-07
Attending: ANESTHESIOLOGY

## 2017-09-07 VITALS — OXYGEN SATURATION: 97 % | DIASTOLIC BLOOD PRESSURE: 82 MMHG | HEART RATE: 81 BPM | SYSTOLIC BLOOD PRESSURE: 125 MMHG

## 2017-09-07 DIAGNOSIS — M54.12 CERVICAL RADICULOPATHY: Primary | ICD-10-CM

## 2017-09-07 DIAGNOSIS — M54.12 CERVICAL RADICULOPATHY: ICD-10-CM

## 2017-09-07 DIAGNOSIS — M50.30 DDD (DEGENERATIVE DISC DISEASE), CERVICAL: ICD-10-CM

## 2017-09-07 PROCEDURE — 64479 NJX AA&/STRD TFRM EPI C/T 1: CPT | Mod: RT | Performed by: ANESTHESIOLOGY

## 2017-09-07 ASSESSMENT — PAIN SCALES - GENERAL: PAINLEVEL: SEVERE PAIN (7)

## 2017-09-07 NOTE — PROGRESS NOTES
Pre procedure Diagnosis: cervical radiculopathy, cervical stenosis, cervical degenerative disc disease   Post procedure Diagnosis: Same  Procedure performed: cervical transforaminal epidural steroid injection at C5-6, on the right, fluoroscopically guided, contrast controlled  Anesthesia: none  Complications: none  Operators: Elfego Trejo MD      Indications:   Marin Mcneil is a 57 year old year old male was sent by Patel Torrez MD for cervical epidural steroid injection. They have a history of chronic neck pain with pain radiating down his right upper extremity into his thumb and index finger associated with numbness, tingling, and weakness with use of the right arm . Exam shows decreased cervical range of motion, tenderness of the cervical paraspinal muscles, Spurling's was not assessed.  They have tried conservative treatment including physical therapy, activity modifications, medications, and previous interventional procedures.     MRI cervical spine was done on 8/2/17 which was read as:  FINDINGS:  The paraspinal soft tissues appear normal.  The bone marrow  has normal signal intensity.     C2-C3:  Mild annular disc bulge. Mild degenerative change in the facet  joints. Central canal and neural foramen are patent.     C3-C4:  Degeneration of the disc. Loss of disc space height.  Left-sided disc protrusion/herniation with an associated osteophyte  extending into the region of the left C3-4 neural foramen. This could  affect the left C4 nerve root. There are moderate-severe degenerative  changes in the left facet joint. Moderate left foraminal stenosis due  to a left-sided uncinate spur and degenerative change off the left  facet joint.     C4-C5:  Degeneration of the disc. Left-sided disc protrusion with an  associated osteophyte causing mild mass effect on the dural sac.  Severe degenerative changes in the left facet joint. Left uncinate  spur. These degenerative changes are leading to  moderate-severe left  foraminal stenosis. These degenerative changes could affect the  exiting left C5 nerve root.     C5-C6:  Degeneration of the disc. Broad-based disc protrusion with  associated posterior osteophytes causing mild mass effect on the dural  sac. Central canal is mildly narrowed. Slight flattening of the  cervical cord. Moderate-severe bilateral foraminal stenosis due to  loss of disc space height and uncinate spurs.     C6-C7:  Degeneration of the disc. Diffuse annular disc bulge. Central  canal adequate. Moderate bilateral foraminal stenosis due to loss of  disc space height and uncinate spurs.     C7-T1: Degeneration of the disc. Mild loss of disc space height. Mild  annular bulge. Central canal normal. Mild right and moderate left  foraminal stenosis due to uncinate spurs.       IMPRESSION:    1. Multilevel degenerative change.  2. No focal right-sided disc herniations are seen.  3. The most significant right-sided findings due to foraminal stenosis  at C5-6 and C6-7 due to loss of disc space height and uncinate spurs.       Options/alternatives, benefits and risks were discussed with the patient including bleeding, infection, tissue trauma, numbness, weakness, paralysis, spinal cord injury, radiation exposure, headache and reaction to medications. Questions were answered to his satisfaction and he agrees to proceed. Voluntary informed consent was obtained and signed.      Vitals were reviewed: Yes  /76  Pulse 81  Allergies were reviewed: Yes   Medications were reviewed: Yes   Pre-procedure pain score: 5/10 in the neck and 7/10 down the arm     Procedure:  After getting informed consent, the patient was brought into the procedure suite and was placed in a supine position on the procedure table. A Pause for the Cause was performed. Patient was prepped and draped in sterile fashion.       The right C5-6 neuroforamen was identified with the C-arm rotated to a right lateral oblique angle. A  total of 1 ml of Lidocaine 1% was used to anesthetize the skin and the needle track at a skin entry site coaxial with the fluoroscopy beam, and overriding the posterior aspect of the neuroforamen. A 27 gauge 3.5 inch spinal needle was advanced towards the posterior aspect of the neuroforamen, first making contact with the superior articular process of C6 with the image intensifier in an oblique view and then it was advanced approximately 1-2 mm further into the foramen with the image intensifier in a PA view. Aspiration was negative for blood or CSF.       Needle position was then verified by injecting 1 ml of Omnipaque-300 utilizing real time fluoroscopy which showed good needle placement and adequate spread along the C6 nerve root and medially into the neuroforamen and epidural space without signs of intravascular or intrathecal uptake. 9 ml of Omnipaque was wasted      Then, after repeated negative aspiration, 2 ml of a combination of 1 ml of 2% Lidocaine and 10 mg of dexamethasone was injected. The needle was flushed with lidocaine and removed.      During the procedure, there was a paresthesia described as a pressure sensation in his usual pain distribution.     Hemostasis was achieved, the area was cleaned, and bandaids were placed when appropriate.  The patient tolerated the procedure well, and was taken to the recovery room.   Images were saved to PACS.     Post-procedure pain score: 0/10 in the neck and 2/10 in the right upper extremity  Follow-up includes:   -f/u phone call in one week  -f/u with referring provider     Elfego Trejo MD  [facility]

## 2017-09-07 NOTE — MR AVS SNAPSHOT
After Visit Summary   9/7/2017    Marin Mcneil    MRN: [id number]           Patient Information     Date Of Birth          1960        Visit Information        Provider Department      9/7/2017 2:45 PM Elfego Ramirez MD [facility]        Care Instructions    [facility]   Procedure Discharge Instructions    Today you saw:    Dr. Elfego Trejo      You had an:  Epidural steroid injection  -cervical     Medications used:  Lidocaine   1 & 2%   Dexamethasone  Omnipaque     Normal saline          Be cautious when walking. Numbness and/or weakness in the lower extremities may occur for up to 6-8 hours after the procedure due to effect of the local anesthetic    Do not drive for 6 hours. The effect of the local anesthetic could slow your reflexes.     You may resume your regular activities after 24 hours    Avoid strenuous activity for the first 24 hours    You may shower, however avoid swimming, tub baths or hot tubs for 24 hours following your procedure    You may have a mild to moderate increase in pain for several days following the injection.    It may take up to 14 days for the steroid medication to start working although you may feel the effect as early as a few days after the procedure.       You may use ice packs for 10-15 minutes, 3 to 4 times a day at the injection site for comfort    Do not use heat to painful areas for 6 to 8 hours. This will give the local anesthetic time to wear off and prevent you from accidentally burning your skin.     You may use anti-inflammatory medications (such as Ibuprofen or Aleve or Advil) or Tylenol for pain control if necessary    If you were fasting, you may resume your normal diet and medications after the procedure    If you have diabetes, check your blood sugar more frequently than usual as your blood sugar may be higher than normal for 10-14 days following a steroid injection. Contact your doctor who  manages your diabetes if your blood sugar is higher than usual    If you experience any of the following, call the pain center nursing line during work hours at [phone] or the after hours provider line at [phone]:  -Fever over 100 degree F  -Swelling, bleeding, redness, drainage, warmth at the injection site  -Progressive weakness or numbness in your legs or arms  -Loss of bowel or bladder function  -Unusual headache that is not relieved by Tylenol  -Unusual new onset of pain that is not improving      Phone #s:  Appointment line: [phone];  Nurse line: [phone]              Follow-ups after your visit        Who to contact     If you have questions or need follow up information about today's clinic visit or your schedule please contact [facility] directly at [phone].  Normal or non-critical lab and imaging results will be communicated to you by [employer], letter or phone within 4 business days after the clinic has received the results. If you do not hear from us within 7 days, please contact the clinic through [employer] or phone. If you have a critical or abnormal lab result, we will notify you by phone as soon as possible.  Submit refill requests through [employer] or call your pharmacy and they will forward the refill request to us. Please allow 3 business days for your refill to be completed.          Additional Information About Your Visit        [employer] Information     [employer] gives you secure access to your electronic health record. If you see a primary care provider, you can also send messages to your care team and make appointments. If you have questions, please call your primary care clinic.  If you do not have a primary care provider, please call [phone] and they will assist you.        Care EveryWhere ID     This is your Care EveryWhere ID. This could be used by other organizations to access your [place] medical records  [id number]        Your Vitals Were     Pulse                    81            Blood Pressure from Last 3 Encounters:   09/07/17 114/76   07/27/17 126/78   07/26/17 120/80    Weight from Last 3 Encounters:   07/27/17 77.1 kg (170 lb)   07/26/17 76.7 kg (169 lb)   06/01/17 78 kg (172 lb)              Today, you had the following     No orders found for display       Primary Care Provider Office Phone # Fax #    Patel Torrez [phone] [id number] [address] [facility] [address]        Equal Access to Services     [facility]: Hadii aad ku hadasho [place], waaxda luqadaha, qaybta kaalmada reena, jaimie lu . So [facility] [phone].    ATENCIÓN: Si habla español, tiene a kirk disposición servicios gratuitos de asistencia lingüística. [facility] [phone].    We comply with applicable federal civil rights laws and [place] laws. We do not discriminate on the basis of race, color, national origin, age, disability sex, sexual orientation or gender identity.            Thank you!     Thank you for choosing [facility]3]  for your care. Our goal is always to provide you with excellent care. Hearing back from our patients is one way we can continue to improve our services. Please take a few minutes to complete the written survey that you may receive in the mail after your visit with us. Thank you!             Your Updated Medication List - Protect others around you: Learn how to safely use, store and throw away your medicines at www.disposemymeds.org.          This list is accurate as of: 9/7/17  3:16 PM.  Always use your most recent med list.                   Brand Name Dispense Instructions for use Diagnosis    albuterol 108 (90 BASE) MCG/ACT Inhaler    PROAIR HFA/PROVENTIL HFA/VENTOLIN HFA    1 Inhaler    Inhale 2 puffs into the lungs every 4 hours as needed for shortness of breath / dyspnea    Moderate persistent asthma without complication       atorvastatin 40 MG tablet    LIPITOR    90 tablet    Take  1 tablet (40 mg) by mouth daily    High blood cholesterol       azelastine 0.05 % Soln ophthalmic solution    OPTIVAR    1 Bottle    Apply 1 drop to eye 2 times daily    Seasonal allergies       buPROPion 150 MG 24 hr tablet    WELLBUTRIN XL    10 tablet    Take 1 tablet (150 mg) by mouth every morning    Major depressive disorder, recurrent episode, mild (H)       cetirizine 10 MG tablet    zyrTEC    90 tablet    Take 1 tablet (10 mg) by mouth every evening    Environmental allergies       doxazosin 4 MG tablet    CARDURA    180 tablet    Take 2 tablets (8 mg) by mouth At Bedtime    Benign non-nodular prostatic hyperplasia with lower urinary tract symptoms       fluticasone 50 MCG/ACT spray    FLONASE    1 Bottle    Spray 1-2 sprays into both nostrils daily    Seasonal allergies       gabapentin 300 MG capsule    NEURONTIN    360 capsule    Take 2 capsules (600 mg) by mouth 2 times daily    Chronic neck pain       lisinopril 10 MG tablet    PRINIVIL/ZESTRIL    90 tablet    Take 1 tablet (10 mg) by mouth daily    Essential hypertension with goal blood pressure less than 140/90       methocarbamol 500 MG tablet    ROBAXIN    180 tablet    Take 1 tablet (500 mg) by mouth 2 times daily as needed for muscle spasms    Cervical strain, subsequent encounter, Chronic neck pain       mometasone-formoterol 100-5 MCG/ACT oral inhaler    DULERA    13 g    Inhale 2 puffs into the lungs 2 times daily    Moderate persistent asthma without complication       montelukast 10 MG tablet    SINGULAIR    90 tablet    Take 1 tablet (10 mg) by mouth At Bedtime    Environmental allergies       olopatadine 0.1 % ophthalmic solution    PATANOL    5 mL    Place 1 drop into both eyes 2 times daily    Seasonal allergies       omeprazole 20 MG CR capsule    priLOSEC    90 capsule    Take 1 capsule (20 mg) by mouth daily    Gastroesophageal reflux disease, esophagitis presence not specified       * oxyCODONE-acetaminophen 5-325 MG per tablet     PERCOCET    60 tablet    Take 1 tablet by mouth 2 times daily as needed for moderate to severe pain    Chronic neck pain, Cervical strain, subsequent encounter       * oxyCODONE-acetaminophen 5-325 MG per tablet    PERCOCET    60 tablet    Take 1 tablet by mouth 2 times daily as needed for moderate to severe pain    Cervical strain, subsequent encounter, Right cervical radiculopathy       * oxyCODONE-acetaminophen 5-325 MG per tablet   Start taking on:  9/26/2017    PERCOCET    60 tablet    Take 1 tablet by mouth 2 times daily as needed for moderate to severe pain    Chronic neck pain, Cervical strain, subsequent encounter       SYMBICORT 80-4.5 MCG/ACT Inhaler   Generic drug:  budesonide-formoterol     10.2 g    INHALE TWO PUFFS BY MOUTH TWICE A DAY    Moderate persistent asthma without complication       varenicline 0.5 MG X 11 & 1 MG X 42 tablet    CHANTIX STARTING MONTH RED    53 tablet    Take 0.5 mg tab daily for 3 days, then 0.5 mg tab twice daily for 4 days, then 1 mg twice daily.    Tobacco abuse       * Notice:  This list has 3 medication(s) that are the same as other medications prescribed for you. Read the directions carefully, and ask your doctor or other care provider to review them with you.

## 2017-09-07 NOTE — NURSING NOTE
Injection intake:    If this procedure is requiring IV sedation has patient been NPO for 6  Hours? NA    Is patient on coumadin, plavix or other prescribed blood thinner?   No    If patient is on coumadin was it held for 5 days?   NA    If patient is on plavix was it held for 7 days?    NA     Does patient take aspirin?  No    If this is for a cervical procedure and patient is on aspirin has it been held for 6 days?   NA    Any allergies to contrast dye, iodine, steroid and/or numbing medications?  NO    Is patient currently taking antibiotics or have an active infection?  NO    Does patient have a ? Yes       Is patient pregnant or breastfeeding?  Not Applicable    Are the vital signs normal?  Yes    Alise Ignacio RN-BSN  [facility]

## 2017-09-07 NOTE — PATIENT INSTRUCTIONS
[facility]   Procedure Discharge Instructions    Today you saw:    Dr. Elfego Trejo      You had an:  Epidural steroid injection  -cervical     Medications used:  Lidocaine   1 & 2%   Dexamethasone  Omnipaque     Normal saline          Be cautious when walking. Numbness and/or weakness in the lower extremities may occur for up to 6-8 hours after the procedure due to effect of the local anesthetic    Do not drive for 6 hours. The effect of the local anesthetic could slow your reflexes.     You may resume your regular activities after 24 hours    Avoid strenuous activity for the first 24 hours    You may shower, however avoid swimming, tub baths or hot tubs for 24 hours following your procedure    You may have a mild to moderate increase in pain for several days following the injection.    It may take up to 14 days for the steroid medication to start working although you may feel the effect as early as a few days after the procedure.       You may use ice packs for 10-15 minutes, 3 to 4 times a day at the injection site for comfort    Do not use heat to painful areas for 6 to 8 hours. This will give the local anesthetic time to wear off and prevent you from accidentally burning your skin.     You may use anti-inflammatory medications (such as Ibuprofen or Aleve or Advil) or Tylenol for pain control if necessary    If you were fasting, you may resume your normal diet and medications after the procedure    If you have diabetes, check your blood sugar more frequently than usual as your blood sugar may be higher than normal for 10-14 days following a steroid injection. Contact your doctor who manages your diabetes if your blood sugar is higher than usual    If you experience any of the following, call the pain center nursing line during work hours at [phone] or the after hours provider line at [phone]:  -Fever over 100 degree F  -Swelling, bleeding, redness, drainage, warmth at the  injection site  -Progressive weakness or numbness in your legs or arms  -Loss of bowel or bladder function  -Unusual headache that is not relieved by Tylenol  -Unusual new onset of pain that is not improving      Phone #s:  Appointment line: [phone];  Nurse line: [phone]

## 2017-09-07 NOTE — NURSING NOTE
Discharge Information    IV Discontiued Time:  1530 catheter intact    Amount of Fluid Infused:  Saline locked    Discharge Criteria = When patient returns to baseline or as per MD order    Consciousness:  Pt is fully awake    Circulation:  BP +/- 20% of pre-procedure level    Respiration:  Patient is able to breathe deeply    O2 Sat:  Patient is able to maintain O2 Sat >92% on room air    Activity:  Moves 4 extremities on command    Ambulation:  Patient is able to stand and walk or stand and pivot into wheelchair    Dressing:  Clean/dry or No Dressing    Notes:   Discharge instructions and AVS given to patient    Patient meets criteria for discharge?  YES    Admitted to PCU?  No    Responsible adult present to accompany patient home?  Yes    Signature/Title:    zev kerns RN Care Coordinator  [facility]

## 2017-09-14 ENCOUNTER — TELEPHONE (OUTPATIENT)
Dept: RADIOLOGY | Facility: CLINIC | Age: 57
End: 2017-09-14

## 2017-09-14 NOTE — TELEPHONE ENCOUNTER
Patient had a cervical transforaminal epidural steroid injection at C5-6, on the right, fluoroscopically guided, contrast controlled on 9/7/17.  Called patient for an update.      Pt reported the following details:  He has not had any changes in his pain since the injection.   Told patient that the information will be forwarded to her provider.  Also explained that, if a steroid medication was used, it could take up to 14 days to feel the full effect and if pt has any further questions or concerns pt should call the nurse line at [phone].

## 2017-09-14 NOTE — TELEPHONE ENCOUNTER
Information noted.  He had very good relief at the time of discharge.  Allow two weeks for full effect.  He may need to repeat the injection or request PCP to place neurosurgical referral for eval and treat.    Elfego Trejo MD  [facility]

## 2017-09-28 DIAGNOSIS — Z91.09 ENVIRONMENTAL ALLERGIES: ICD-10-CM

## 2017-09-28 DIAGNOSIS — F33.0 MAJOR DEPRESSIVE DISORDER, RECURRENT EPISODE, MILD (H): ICD-10-CM

## 2017-09-28 DIAGNOSIS — E78.00 HIGH BLOOD CHOLESTEROL: ICD-10-CM

## 2017-09-28 DIAGNOSIS — Z72.0 TOBACCO ABUSE: ICD-10-CM

## 2017-09-28 DIAGNOSIS — J30.2 SEASONAL ALLERGIES: ICD-10-CM

## 2017-09-28 DIAGNOSIS — J45.40 MODERATE PERSISTENT ASTHMA WITHOUT COMPLICATION: ICD-10-CM

## 2017-09-28 RX ORDER — FLUTICASONE PROPIONATE 50 MCG
SPRAY, SUSPENSION (ML) NASAL
Qty: 16 G | Refills: 8 | Status: SHIPPED | OUTPATIENT
Start: 2017-09-28 | End: 2019-11-20

## 2017-09-28 RX ORDER — VARENICLINE TARTRATE 1 MG/1
1 TABLET, FILM COATED ORAL 2 TIMES DAILY
Qty: 56 TABLET | Refills: 2 | Status: SHIPPED | OUTPATIENT
Start: 2017-09-28 | End: 2018-01-10

## 2017-09-28 RX ORDER — CETIRIZINE HYDROCHLORIDE 10 MG/1
TABLET ORAL
Qty: 90 TABLET | Refills: 2 | Status: SHIPPED | OUTPATIENT
Start: 2017-09-28 | End: 2019-02-14

## 2017-09-28 RX ORDER — BUPROPION HYDROCHLORIDE 150 MG/1
TABLET ORAL
Qty: 10 TABLET | Refills: 1 | OUTPATIENT
Start: 2017-09-28

## 2017-09-28 RX ORDER — MONTELUKAST SODIUM 10 MG/1
TABLET ORAL
Qty: 90 TABLET | Refills: 2 | Status: SHIPPED | OUTPATIENT
Start: 2017-09-28 | End: 2019-02-14

## 2017-09-28 RX ORDER — ALBUTEROL SULFATE 90 UG/1
AEROSOL, METERED RESPIRATORY (INHALATION)
Qty: 18 G | Refills: 0 | Status: SHIPPED | OUTPATIENT
Start: 2017-09-28 | End: 2018-01-10

## 2017-09-28 RX ORDER — ATORVASTATIN CALCIUM 40 MG/1
TABLET, FILM COATED ORAL
Qty: 90 TABLET | Refills: 2 | Status: SHIPPED | OUTPATIENT
Start: 2017-09-28 | End: 2019-02-14

## 2017-10-14 DIAGNOSIS — F33.0 MAJOR DEPRESSIVE DISORDER, RECURRENT EPISODE, MILD (H): ICD-10-CM

## 2017-10-17 NOTE — TELEPHONE ENCOUNTER
Left message for patient to call back and speak with nurse.   Need to know if he stopped medication. Update PHQ9. Nilam Layne RN      Note at office visit 7/27 instructions below.   Patient Instructions   1. Reduce the Wellbutrin to 150 mg daily for 10 days then stop. Let me know if your mood becomes a problem

## 2017-10-18 RX ORDER — BUPROPION HYDROCHLORIDE 150 MG/1
TABLET ORAL
Qty: 10 TABLET | Refills: 1 | COMMUNITY
Start: 2017-10-18 | End: 2017-10-18

## 2017-10-18 RX ORDER — DULOXETIN HYDROCHLORIDE 30 MG/1
30 CAPSULE, DELAYED RELEASE ORAL 2 TIMES DAILY
Qty: 180 CAPSULE | Refills: 1 | Status: SHIPPED | OUTPATIENT
Start: 2017-10-18 | End: 2018-09-07

## 2017-10-18 ASSESSMENT — PATIENT HEALTH QUESTIONNAIRE - PHQ9: SUM OF ALL RESPONSES TO PHQ QUESTIONS 1-9: 8

## 2017-10-18 NOTE — TELEPHONE ENCOUNTER
Per patient, would like to restart the Wellbutrin.  Patient believes he needs the medication.   Feels like he does better when he is on medication.     PHQ-9 SCORE 9/6/2016 5/8/2017 10/18/2017   Total Score - - -   Total Score MyChart - - -   Total Score 8 9 8     Medication had been discontinued in July.     Patient does have an upcoming appointment with Dr Torrez, 10/24/17  Patient would like to start medication now, if Dr Torrez is agreeable.    Also discussed potentially an alternative medication.    Advise if that is case, advise to wait until office visit next week.    Requests message be sent to Dr Torrez to review.    Please advise  Sarah Patel RN

## 2017-10-18 NOTE — TELEPHONE ENCOUNTER
I spoke to Juan Alberto. His mood is going down without the Wellbutrin.    He wants to try Cymbalta which I approved.    He is coming in next week for follow-up.    Ptael Torrez M.D.

## 2017-10-30 ENCOUNTER — OFFICE VISIT (OUTPATIENT)
Dept: FAMILY MEDICINE | Facility: CLINIC | Age: 57
End: 2017-10-30
Payer: MEDICAID

## 2017-10-30 VITALS
TEMPERATURE: 98 F | OXYGEN SATURATION: 97 % | DIASTOLIC BLOOD PRESSURE: 65 MMHG | SYSTOLIC BLOOD PRESSURE: 102 MMHG | WEIGHT: 172 LBS | BODY MASS INDEX: 25.4 KG/M2 | HEART RATE: 68 BPM

## 2017-10-30 DIAGNOSIS — S16.1XXD CERVICAL STRAIN, SUBSEQUENT ENCOUNTER: ICD-10-CM

## 2017-10-30 DIAGNOSIS — G89.29 CHRONIC NECK PAIN: Primary | ICD-10-CM

## 2017-10-30 DIAGNOSIS — M54.12 RIGHT CERVICAL RADICULOPATHY: ICD-10-CM

## 2017-10-30 DIAGNOSIS — Z23 NEED FOR PROPHYLACTIC VACCINATION AND INOCULATION AGAINST INFLUENZA: ICD-10-CM

## 2017-10-30 DIAGNOSIS — M54.2 CHRONIC NECK PAIN: Primary | ICD-10-CM

## 2017-10-30 PROCEDURE — 99214 OFFICE O/P EST MOD 30 MIN: CPT | Mod: 25 | Performed by: FAMILY MEDICINE

## 2017-10-30 PROCEDURE — 90686 IIV4 VACC NO PRSV 0.5 ML IM: CPT | Performed by: FAMILY MEDICINE

## 2017-10-30 PROCEDURE — 90471 IMMUNIZATION ADMIN: CPT | Performed by: FAMILY MEDICINE

## 2017-10-30 RX ORDER — OXYCODONE AND ACETAMINOPHEN 5; 325 MG/1; MG/1
1 TABLET ORAL 2 TIMES DAILY PRN
Qty: 60 TABLET | Refills: 0 | Status: SHIPPED | OUTPATIENT
Start: 2017-11-30 | End: 2019-11-19

## 2017-10-30 RX ORDER — OXYCODONE AND ACETAMINOPHEN 5; 325 MG/1; MG/1
1 TABLET ORAL 2 TIMES DAILY PRN
Qty: 60 TABLET | Refills: 0 | Status: SHIPPED | OUTPATIENT
Start: 2017-10-30 | End: 2019-11-19

## 2017-10-30 RX ORDER — OXYCODONE AND ACETAMINOPHEN 5; 325 MG/1; MG/1
1 TABLET ORAL 2 TIMES DAILY PRN
Qty: 60 TABLET | Refills: 0 | Status: SHIPPED | OUTPATIENT
Start: 2017-12-30 | End: 2019-11-19

## 2017-10-30 NOTE — PATIENT INSTRUCTIONS
1. I have refilled your pain meds.    2. Set up an appointment with - [facility] [phone].    3. If surgery is not advised, then we should do a second steroid shot in about one month.

## 2017-10-30 NOTE — NURSING NOTE
"Chief Complaint   Patient presents with     Neck Pain       Initial /65 (Cuff Size: Adult Regular)  Pulse 68  Temp 98  F (36.7  C) (Oral)  Wt 172 lb (78 kg)  SpO2 97%  BMI 25.4 kg/m2 Estimated body mass index is 25.4 kg/(m^2) as calculated from the following:    Height as of 6/1/17: 5' 9\" (1.753 m).    Weight as of this encounter: 172 lb (78 kg).  Medication Reconciliation: complete    Kiki Araujo LPN    "

## 2017-10-30 NOTE — PROGRESS NOTES

## 2017-10-30 NOTE — MR AVS SNAPSHOT
After Visit Summary   10/30/2017    Marin Mcneil    MRN: [id number]           Patient Information     Date Of Birth          1960        Visit Information        Provider Department      10/30/2017 10:50 AM Patel Torrez MD [facility]        Today's Diagnoses     Need for prophylactic vaccination and inoculation against influenza    -  1    Chronic neck pain        Cervical strain, subsequent encounter        Right cervical radiculopathy          Care Instructions    1. I have refilled your pain meds.    2. Set up an appointment with - [facility] [phone].    3. If surgery is not advised, then we should do a second steroid shot in about one month.          Follow-ups after your visit        Additional Services     NEUROSURGERY REFERRAL       Your provider has referred you to: [facility]: [facility] [phone]   [email or web address]    Please be aware that coverage of these services is subject to the terms and limitations of your health insurance plan.  Call member services at your health plan with any benefit or coverage questions.      Please bring the following with you to your appointment:    (1) Any X-Rays, CTs or MRIs which have been performed.  Contact the facility where they were done to arrange for  prior to your scheduled appointment.   (2) List of current medications  (3) This referral request   (4) Any documents/labs given to you for this referral                  Who to contact     If you have questions or need follow up information about today's clinic visit or your schedule please contact [facility] directly at [phone].  Normal or non-critical lab and imaging results will be communicated to you by MyChart, letter or phone within 4 business days after the clinic has received the results. If you do not hear from us within 7 days, please contact the clinic  through [employer] or phone. If you have a critical or abnormal lab result, we will notify you by phone as soon as possible.  Submit refill requests through [employer] or call your pharmacy and they will forward the refill request to us. Please allow 3 business days for your refill to be completed.          Additional Information About Your Visit        [employer] Information     [employer] gives you secure access to your electronic health record. If you see a primary care provider, you can also send messages to your care team and make appointments. If you have questions, please call your primary care clinic.  If you do not have a primary care provider, please call [phone] and they will assist you.        Care EveryWhere ID     This is your Care EveryWhere ID. This could be used by other organizations to access your [place] medical records  [id number]        Your Vitals Were     Pulse Temperature Pulse Oximetry BMI (Body Mass Index)          68 98  F (36.7  C) (Oral) 97% 25.4 kg/m2         Blood Pressure from Last 3 Encounters:   10/30/17 102/65   09/07/17 125/82   07/27/17 126/78    Weight from Last 3 Encounters:   10/30/17 172 lb (78 kg)   07/27/17 170 lb (77.1 kg)   07/26/17 169 lb (76.7 kg)              We Performed the Following     FLU VAC, SPLIT VIRUS IM > 3 YO (QUADRIVALENT) [64949]     NEUROSURGERY REFERRAL     Vaccine Administration, Initial [65197]          Today's Medication Changes          These changes are accurate as of: 10/30/17 11:38 AM.  If you have any questions, ask your nurse or doctor.               Stop taking these medicines if you haven't already. Please contact your care team if you have questions.     mometasone-formoterol 100-5 MCG/ACT oral inhaler   Commonly known as:  DULERA   Stopped by:  Patel Torrez MD                Where to get your medicines      Some of these will need a paper prescription and others can be bought over the counter.  Ask your nurse if you have questions.      Bring a paper prescription for each of these medications     oxyCODONE-acetaminophen 5-325 MG per tablet    oxyCODONE-acetaminophen 5-325 MG per tablet    oxyCODONE-acetaminophen 5-325 MG per tablet                Primary Care Provider Office Phone # Fax #    Patel Torrez [phone] [id number] [address] [facility] [address]        Equal Access to Services     [facility] Center[facility]ADAN : Hadii aad ku hadasho Soomaali, waaxda luqadaha, qaybta kaalmada adeegyada, waxay idiin hayaan adeeg kharash la'aan . So [facility] [phone].    ATENCIÓN: Si habla español, tiene a kirk disposición servicios gratuitos de asistencia lingüística. [facility] [phone].    We comply with applicable federal civil rights laws and [place] laws. We do not discriminate on the basis of race, color, national origin, age, disability, sex, sexual orientation, or gender identity.            Thank you!     Thank you for choosing [facility]  for your care. Our goal is always to provide you with excellent care. Hearing back from our patients is one way we can continue to improve our services. Please take a few minutes to complete the written survey that you may receive in the mail after your visit with us. Thank you!             Your Updated Medication List - Protect others around you: Learn how to safely use, store and throw away your medicines at www.disposemymeds.org.          This list is accurate as of: 10/30/17 11:38 AM.  Always use your most recent med list.                   Brand Name Dispense Instructions for use Diagnosis    atorvastatin 40 MG tablet    LIPITOR    90 tablet    TAKE ONE TABLET BY MOUTH EVERY DAY    High blood cholesterol       azelastine 0.05 % Soln ophthalmic solution    OPTIVAR    1 Bottle    Apply 1 drop to eye 2 times daily    Seasonal allergies       cetirizine 10 MG tablet    zyrTEC    90 tablet    TAKE ONE TABLET BY MOUTH EVERY EVENING    Environmental allergies       doxazosin 4 MG tablet     CARDURA    180 tablet    Take 2 tablets (8 mg) by mouth At Bedtime    Benign non-nodular prostatic hyperplasia with lower urinary tract symptoms       DULoxetine 30 MG EC capsule    CYMBALTA    180 capsule    Take 1 capsule (30 mg) by mouth 2 times daily    Major depressive disorder, recurrent episode, mild (H)       fluticasone 50 MCG/ACT spray    FLONASE    16 g    USE ONE TO TWO SPRAYS IN EACH NOSTRIL EVERY DAY    Seasonal allergies       gabapentin 300 MG capsule    NEURONTIN    360 capsule    Take 2 capsules (600 mg) by mouth 2 times daily    Chronic neck pain       lisinopril 10 MG tablet    PRINIVIL/ZESTRIL    90 tablet    Take 1 tablet (10 mg) by mouth daily    Essential hypertension with goal blood pressure less than 140/90       methocarbamol 500 MG tablet    ROBAXIN    180 tablet    Take 1 tablet (500 mg) by mouth 2 times daily as needed for muscle spasms    Cervical strain, subsequent encounter, Chronic neck pain       montelukast 10 MG tablet    SINGULAIR    90 tablet    TAKE ONE TABLET BY MOUTH EVERY NIGHT AT BEDTIME    Environmental allergies       olopatadine 0.1 % ophthalmic solution    PATANOL    5 mL    Place 1 drop into both eyes 2 times daily    Seasonal allergies       omeprazole 20 MG CR capsule    priLOSEC    90 capsule    Take 1 capsule (20 mg) by mouth daily    Gastroesophageal reflux disease, esophagitis presence not specified       * oxyCODONE-acetaminophen 5-325 MG per tablet    PERCOCET    60 tablet    Take 1 tablet by mouth 2 times daily as needed for moderate to severe pain    Chronic neck pain, Cervical strain, subsequent encounter       * oxyCODONE-acetaminophen 5-325 MG per tablet   Start taking on:  11/30/2017    PERCOCET    60 tablet    Take 1 tablet by mouth 2 times daily as needed for moderate to severe pain    Cervical strain, subsequent encounter, Right cervical radiculopathy       * oxyCODONE-acetaminophen 5-325 MG per tablet   Start taking on:  12/30/2017    PERCOCET    60  tablet    Take 1 tablet by mouth 2 times daily as needed for moderate to severe pain    Chronic neck pain, Cervical strain, subsequent encounter       SYMBICORT 80-4.5 MCG/ACT Inhaler   Generic drug:  budesonide-formoterol     10.2 g    INHALE TWO PUFFS BY MOUTH TWICE A DAY    Moderate persistent asthma without complication       * varenicline 0.5 MG X 11 & 1 MG X 42 tablet    CHANTIX STARTING MONTH RED    53 tablet    Take 0.5 mg tab daily for 3 days, then 0.5 mg tab twice daily for 4 days, then 1 mg twice daily.    Tobacco abuse       * varenicline 1 MG tablet    CHANTIX    56 tablet    Take 1 tablet (1 mg) by mouth 2 times daily    Tobacco abuse       VENTOLIN  (90 BASE) MCG/ACT Inhaler   Generic drug:  albuterol     18 g    INHALE TWO PUFFS BY MOUTH EVERY 4 HOURS AS NEEDED FOR SHORTNESS OF BREATH    Moderate persistent asthma without complication       * Notice:  This list has 5 medication(s) that are the same as other medications prescribed for you. Read the directions carefully, and ask your doctor or other care provider to review them with you.

## 2017-10-31 ASSESSMENT — ASTHMA QUESTIONNAIRES: ACT_TOTALSCORE: 20

## 2017-11-13 ENCOUNTER — OFFICE VISIT (OUTPATIENT)
Dept: NEUROSURGERY | Facility: CLINIC | Age: 57
End: 2017-11-13
Attending: FAMILY MEDICINE
Payer: MEDICAID

## 2017-11-13 VITALS — BODY MASS INDEX: 25.98 KG/M2 | TEMPERATURE: 98 F | WEIGHT: 175.4 LBS | HEIGHT: 69 IN

## 2017-11-13 DIAGNOSIS — M50.30 DISC DISEASE, DEGENERATIVE, CERVICAL: Primary | ICD-10-CM

## 2017-11-13 DIAGNOSIS — M54.12 CERVICAL RADICULOPATHY: ICD-10-CM

## 2017-11-13 PROCEDURE — 99244 OFF/OP CNSLTJ NEW/EST MOD 40: CPT | Performed by: PHYSICIAN ASSISTANT

## 2017-11-13 ASSESSMENT — PAIN SCALES - GENERAL: PAINLEVEL: SEVERE PAIN (6)

## 2017-11-13 NOTE — PROGRESS NOTES
"Marin Mcneil is a 57 year old male who presents for:  Chief Complaint   Patient presents with     Neurologic Problem     Neck pain w/ right arm pain & numbness        Initial Vitals:  Temp 98  F (36.7  C) (Temporal)  Ht 5' 9\" (1.753 m)  Wt 175 lb 6.4 oz (79.6 kg)  BMI 25.9 kg/m2 Estimated body mass index is 25.9 kg/(m^2) as calculated from the following:    Height as of this encounter: 5' 9\" (1.753 m).    Weight as of this encounter: 175 lb 6.4 oz (79.6 kg).. Body surface area is 1.97 meters squared. BP completed using cuff size: NA (Not Taken)  Severe Pain (6)    Do you feel safe in your environment?  Yes  Do you need any refills today? No    Nursing Comments:         Glory Tang CMA    "
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
## 2017-11-13 NOTE — MR AVS SNAPSHOT
"              After Visit Summary   11/13/2017    Marin Mcneil    MRN: [id number]           Patient Information     Date Of Birth          1960        Visit Information        Provider Department      11/13/2017 11:30 AM Hima Rice PA-C [facility]        Today's Diagnoses     Disc disease, degenerative, cervical    -  1    Cervical radiculopathy           Follow-ups after your visit        Follow-up notes from your care team     Return for next avail w Dr. Dey.      Who to contact     If you have questions or need follow up information about today's clinic visit or your schedule please contact [facility] directly at [phone].  Normal or non-critical lab and imaging results will be communicated to you by MyChart, letter or phone within 4 business days after the clinic has received the results. If you do not hear from us within 7 days, please contact the clinic through Apertiohart or phone. If you have a critical or abnormal lab result, we will notify you by phone as soon as possible.  Submit refill requests through [employer] or call your pharmacy and they will forward the refill request to us. Please allow 3 business days for your refill to be completed.          Additional Information About Your Visit        MyChart Information     [employer] gives you secure access to your electronic health record. If you see a primary care provider, you can also send messages to your care team and make appointments. If you have questions, please call your primary care clinic.  If you do not have a primary care provider, please call [phone] and they will assist you.        Care EveryWhere ID     This is your Care EveryWhere ID. This could be used by other organizations to access your [place] medical records  [id number]        Your Vitals Were     Temperature Height BMI (Body Mass Index)             98  F (36.7  C) (Temporal) 5' 9\" (1.753 m) 25.9 kg/m2          Blood Pressure from " Last 3 Encounters:   10/30/17 102/65   09/07/17 125/82   07/27/17 126/78    Weight from Last 3 Encounters:   11/13/17 175 lb 6.4 oz (79.6 kg)   10/30/17 172 lb (78 kg)   07/27/17 170 lb (77.1 kg)              Today, you had the following     No orders found for display       Primary Care Provider Office Phone # Fax #    Patel Torrez [phone] [address] [facility] [address]        Equal Access to Services     [facility]: Hadii akua preston hadasho Sorakan, waaxda luqadaha, qaybta kaalmada reena, jaimie lu . So [facility] [phone].    ATENCIÓN: Si habla español, tiene a kirk disposición servicios gratuitos de asistencia lingüística. [facility] [phone].    We comply with applicable federal civil rights laws and [place] laws. We do not discriminate on the basis of race, color, national origin, age, disability, sex, sexual orientation, or gender identity.            Thank you!     Thank you for choosing [facility]  for your care. Our goal is always to provide you with excellent care. Hearing back from our patients is one way we can continue to improve our services. Please take a few minutes to complete the written survey that you may receive in the mail after your visit with us. Thank you!             Your Updated Medication List - Protect others around you: Learn how to safely use, store and throw away your medicines at www.disposemymeds.org.          This list is accurate as of: 11/13/17 12:11 PM.  Always use your most recent med list.                   Brand Name Dispense Instructions for use Diagnosis    atorvastatin 40 MG tablet    LIPITOR    90 tablet    TAKE ONE TABLET BY MOUTH EVERY DAY    High blood cholesterol       azelastine 0.05 % Soln ophthalmic solution    OPTIVAR    1 Bottle    Apply 1 drop to eye 2 times daily    Seasonal allergies       cetirizine 10 MG tablet    zyrTEC    90 tablet    TAKE ONE TABLET BY MOUTH EVERY  EVENING    Environmental allergies       doxazosin 4 MG tablet    CARDURA    180 tablet    Take 2 tablets (8 mg) by mouth At Bedtime    Benign non-nodular prostatic hyperplasia with lower urinary tract symptoms       DULoxetine 30 MG EC capsule    CYMBALTA    180 capsule    Take 1 capsule (30 mg) by mouth 2 times daily    Major depressive disorder, recurrent episode, mild (H)       fluticasone 50 MCG/ACT spray    FLONASE    16 g    USE ONE TO TWO SPRAYS IN EACH NOSTRIL EVERY DAY    Seasonal allergies       gabapentin 300 MG capsule    NEURONTIN    360 capsule    Take 2 capsules (600 mg) by mouth 2 times daily    Chronic neck pain       lisinopril 10 MG tablet    PRINIVIL/ZESTRIL    90 tablet    Take 1 tablet (10 mg) by mouth daily    Essential hypertension with goal blood pressure less than 140/90       methocarbamol 500 MG tablet    ROBAXIN    180 tablet    Take 1 tablet (500 mg) by mouth 2 times daily as needed for muscle spasms    Cervical strain, subsequent encounter, Chronic neck pain       montelukast 10 MG tablet    SINGULAIR    90 tablet    TAKE ONE TABLET BY MOUTH EVERY NIGHT AT BEDTIME    Environmental allergies       olopatadine 0.1 % ophthalmic solution    PATANOL    5 mL    Place 1 drop into both eyes 2 times daily    Seasonal allergies       omeprazole 20 MG CR capsule    priLOSEC    90 capsule    Take 1 capsule (20 mg) by mouth daily    Gastroesophageal reflux disease, esophagitis presence not specified       * oxyCODONE-acetaminophen 5-325 MG per tablet    PERCOCET    60 tablet    Take 1 tablet by mouth 2 times daily as needed for moderate to severe pain    Chronic neck pain, Cervical strain, subsequent encounter       * oxyCODONE-acetaminophen 5-325 MG per tablet   Start taking on:  11/30/2017    PERCOCET    60 tablet    Take 1 tablet by mouth 2 times daily as needed for moderate to severe pain    Cervical strain, subsequent encounter, Right cervical radiculopathy       * oxyCODONE-acetaminophen  5-325 MG per tablet   Start taking on:  12/30/2017    PERCOCET    60 tablet    Take 1 tablet by mouth 2 times daily as needed for moderate to severe pain    Chronic neck pain, Cervical strain, subsequent encounter       SYMBICORT 80-4.5 MCG/ACT Inhaler   Generic drug:  budesonide-formoterol     10.2 g    INHALE TWO PUFFS BY MOUTH TWICE A DAY    Moderate persistent asthma without complication       * varenicline 0.5 MG X 11 & 1 MG X 42 tablet    CHANTIX STARTING MONTH RED    53 tablet    Take 0.5 mg tab daily for 3 days, then 0.5 mg tab twice daily for 4 days, then 1 mg twice daily.    Tobacco abuse       * varenicline 1 MG tablet    CHANTIX    56 tablet    Take 1 tablet (1 mg) by mouth 2 times daily    Tobacco abuse       VENTOLIN  (90 BASE) MCG/ACT Inhaler   Generic drug:  albuterol     18 g    INHALE TWO PUFFS BY MOUTH EVERY 4 HOURS AS NEEDED FOR SHORTNESS OF BREATH    Moderate persistent asthma without complication       * Notice:  This list has 5 medication(s) that are the same as other medications prescribed for you. Read the directions carefully, and ask your doctor or other care provider to review them with you.

## 2017-11-20 DIAGNOSIS — J45.40 MODERATE PERSISTENT ASTHMA WITHOUT COMPLICATION: ICD-10-CM

## 2017-11-20 DIAGNOSIS — I10 ESSENTIAL HYPERTENSION WITH GOAL BLOOD PRESSURE LESS THAN 140/90: ICD-10-CM

## 2017-11-21 RX ORDER — LISINOPRIL 10 MG/1
TABLET ORAL
Qty: 90 TABLET | Refills: 1 | Status: SHIPPED | OUTPATIENT
Start: 2017-11-21 | End: 2018-09-07

## 2017-11-21 RX ORDER — DILTIAZEM HYDROCHLORIDE 60 MG/1
TABLET, FILM COATED ORAL
Qty: 10.2 G | Refills: 3 | Status: SHIPPED | OUTPATIENT
Start: 2017-11-21 | End: 2018-01-10

## 2018-01-10 ENCOUNTER — TELEPHONE (OUTPATIENT)
Dept: FAMILY MEDICINE | Facility: CLINIC | Age: 58
End: 2018-01-10

## 2018-01-10 DIAGNOSIS — J45.40 MODERATE PERSISTENT ASTHMA WITHOUT COMPLICATION: ICD-10-CM

## 2018-01-10 DIAGNOSIS — Z72.0 TOBACCO ABUSE: ICD-10-CM

## 2018-01-10 DIAGNOSIS — K21.9 GASTROESOPHAGEAL REFLUX DISEASE, ESOPHAGITIS PRESENCE NOT SPECIFIED: ICD-10-CM

## 2018-01-10 RX ORDER — FLUTICASONE PROPIONATE AND SALMETEROL 113; 14 UG/1; UG/1
1 POWDER, METERED RESPIRATORY (INHALATION) 2 TIMES DAILY
Qty: 1 EACH | Refills: 3 | Status: SHIPPED | OUTPATIENT
Start: 2018-01-10 | End: 2018-08-09

## 2018-01-10 RX ORDER — MOMETASONE FUROATE AND FORMOTEROL FUMARATE DIHYDRATE 100; 5 UG/1; UG/1
AEROSOL RESPIRATORY (INHALATION)
Qty: 13 G | Refills: 3 | OUTPATIENT
Start: 2018-01-10

## 2018-01-10 RX ORDER — ALBUTEROL SULFATE 90 UG/1
AEROSOL, METERED RESPIRATORY (INHALATION)
Qty: 18 G | Refills: 0 | Status: SHIPPED | OUTPATIENT
Start: 2018-01-10 | End: 2018-02-27

## 2018-01-10 RX ORDER — VARENICLINE TARTRATE 1 MG/1
TABLET, FILM COATED ORAL
Qty: 56 TABLET | Refills: 2 | Status: SHIPPED | OUTPATIENT
Start: 2018-01-10 | End: 2019-02-14

## 2018-01-10 NOTE — TELEPHONE ENCOUNTER
Patient has new insurance for 2018 and they do not cover Symbicort.  They only cover the generic Airduo but it is only available in the 113/14 and 55/14 strength.  If needs a higher dose will have to get prior auth for Symbicort.    Thank You  Leah Esparza, [facility]  [phone]

## 2018-01-10 NOTE — TELEPHONE ENCOUNTER
Routing refill request to provider for review/approval because:  Drug not active on patient's medication list: jannet Bustos RN

## 2018-02-27 DIAGNOSIS — J45.40 MODERATE PERSISTENT ASTHMA WITHOUT COMPLICATION: ICD-10-CM

## 2018-02-27 DIAGNOSIS — G89.29 CHRONIC NECK PAIN: ICD-10-CM

## 2018-02-27 DIAGNOSIS — M54.2 CHRONIC NECK PAIN: ICD-10-CM

## 2018-02-27 RX ORDER — ALBUTEROL SULFATE 90 UG/1
AEROSOL, METERED RESPIRATORY (INHALATION)
Qty: 18 G | Refills: 1 | Status: SHIPPED | OUTPATIENT
Start: 2018-02-27 | End: 2019-11-19

## 2018-02-28 RX ORDER — GABAPENTIN 300 MG/1
CAPSULE ORAL
Qty: 360 CAPSULE | Refills: 3 | Status: SHIPPED | OUTPATIENT
Start: 2018-02-28 | End: 2019-10-08

## 2018-04-16 DIAGNOSIS — N40.1 BENIGN NON-NODULAR PROSTATIC HYPERPLASIA WITH LOWER URINARY TRACT SYMPTOMS: ICD-10-CM

## 2018-04-17 RX ORDER — DOXAZOSIN 4 MG/1
8 TABLET ORAL AT BEDTIME
Qty: 180 TABLET | Refills: 1 | Status: SHIPPED | OUTPATIENT
Start: 2018-04-17 | End: 2019-11-19

## 2018-08-09 ENCOUNTER — TELEPHONE (OUTPATIENT)
Dept: FAMILY MEDICINE | Facility: CLINIC | Age: 58
End: 2018-08-09

## 2018-08-09 DIAGNOSIS — J45.40 MODERATE PERSISTENT ASTHMA WITHOUT COMPLICATION: Primary | ICD-10-CM

## 2018-08-09 DIAGNOSIS — J30.2 SEASONAL ALLERGIC RHINITIS, UNSPECIFIED CHRONICITY, UNSPECIFIED TRIGGER: Primary | ICD-10-CM

## 2018-08-09 DIAGNOSIS — S16.1XXD CERVICAL STRAIN, SUBSEQUENT ENCOUNTER: ICD-10-CM

## 2018-08-09 DIAGNOSIS — G89.29 CHRONIC NECK PAIN: ICD-10-CM

## 2018-08-09 DIAGNOSIS — M54.2 CHRONIC NECK PAIN: ICD-10-CM

## 2018-08-09 RX ORDER — BUDESONIDE AND FORMOTEROL FUMARATE DIHYDRATE 160; 4.5 UG/1; UG/1
2 AEROSOL RESPIRATORY (INHALATION) 2 TIMES DAILY
Qty: 1 INHALER | Refills: 1 | Status: SHIPPED | OUTPATIENT
Start: 2018-08-09 | End: 2019-11-20

## 2018-08-09 NOTE — TELEPHONE ENCOUNTER
PRIOR AUTHORIZATION REQUIRED ON Airduo  Insurance: [place] Medicaid  BIN #: [id number]  Phone #: [phone]  ID #: [id number]    Advair, Symbicort, and Dulera are covered      Please contact us when PA granted/denied    Chaim Chang [facility]  [phone]

## 2018-08-10 RX ORDER — AZELASTINE HYDROCHLORIDE 0.5 MG/ML
1 SOLUTION/ DROPS OPHTHALMIC 2 TIMES DAILY
Qty: 1 BOTTLE | Refills: 5 | Status: SHIPPED | OUTPATIENT
Start: 2018-08-10 | End: 2019-11-19

## 2018-08-10 RX ORDER — METHOCARBAMOL 500 MG/1
TABLET, FILM COATED ORAL
Qty: 180 TABLET | Refills: 2 | Status: SHIPPED | OUTPATIENT
Start: 2018-08-10 | End: 2019-10-08

## 2018-08-24 ENCOUNTER — TELEPHONE (OUTPATIENT)
Dept: FAMILY MEDICINE | Facility: CLINIC | Age: 58
End: 2018-08-24

## 2018-08-24 NOTE — TELEPHONE ENCOUNTER
Prior Authorization Retail Medication Request    Medication/Dose: azelastine (OPTIVAR) 0.05 % SOLN ophthalmic solution   ICD code (if different than what is on RX):    Previously Tried and Failed:    Rationale:      Insurance Name:  [place] Medicaid [phone]  Insurance ID:  [id number]      Pharmacy Information (if different than what is on RX)  Name:   pharmacy in [place]  Phone:  [phone]

## 2018-08-27 NOTE — TELEPHONE ENCOUNTER
Central Prior Authorization Team   Phone: [phone]    PA Initiation    Medication: azelastine (OPTIVAR) 0.05 % SOLN ophthalmic solution   Insurance Company: [place] Medicaid ([facility]) - Phone [phone] Fax [phone]  Pharmacy Filling the Rx: [place], [place] - [address]   Filling Pharmacy Phone: [phone]  Filling Pharmacy Fax: [phone]  Start Date: 8/27/2018

## 2018-08-27 NOTE — TELEPHONE ENCOUNTER
PRIOR AUTHORIZATION DENIED    Medication: azelastine (OPTIVAR) 0.05 % SOLN ophthalmic solution-DENIED    Denial Date: 8/27/2018    Denial Rational:       Appeal Information:  IF PATIENT IS UNABLE TO TRY/FAIL ALTERNATIVE(S) PLEASE SUPPLY PA TEAM WITH A LETTER OF MEDICAL NECESSITY WITH CLINICAL REASON.

## 2018-09-07 DIAGNOSIS — F33.0 MAJOR DEPRESSIVE DISORDER, RECURRENT EPISODE, MILD (H): ICD-10-CM

## 2018-09-07 DIAGNOSIS — J45.40 MODERATE PERSISTENT ASTHMA WITHOUT COMPLICATION: ICD-10-CM

## 2018-09-07 DIAGNOSIS — I10 ESSENTIAL HYPERTENSION WITH GOAL BLOOD PRESSURE LESS THAN 140/90: ICD-10-CM

## 2018-09-07 DIAGNOSIS — H10.13 ALLERGIC CONJUNCTIVITIS, BILATERAL: Primary | ICD-10-CM

## 2018-09-11 PROBLEM — H10.13 ALLERGIC CONJUNCTIVITIS, BILATERAL: Status: ACTIVE | Noted: 2018-09-11

## 2018-09-11 RX ORDER — LISINOPRIL 10 MG/1
TABLET ORAL
Qty: 90 TABLET | Refills: 0 | Status: SHIPPED | OUTPATIENT
Start: 2018-09-11 | End: 2019-11-19

## 2018-09-11 RX ORDER — ALBUTEROL SULFATE 90 UG/1
AEROSOL, METERED RESPIRATORY (INHALATION)
Qty: 18 G | Refills: 1 | Status: SHIPPED | OUTPATIENT
Start: 2018-09-11 | End: 2019-11-19

## 2018-09-11 RX ORDER — OLOPATADINE HYDROCHLORIDE 1 MG/ML
1 SOLUTION/ DROPS OPHTHALMIC 2 TIMES DAILY
Qty: 5 ML | Refills: 3 | Status: SHIPPED | OUTPATIENT
Start: 2018-09-11 | End: 2019-11-19

## 2018-09-11 RX ORDER — DULOXETIN HYDROCHLORIDE 30 MG/1
CAPSULE, DELAYED RELEASE ORAL
Qty: 180 CAPSULE | Refills: 0 | Status: SHIPPED | OUTPATIENT
Start: 2018-09-11 | End: 2019-11-19

## 2018-09-11 NOTE — TELEPHONE ENCOUNTER
Routing refill request to provider for review/approval because:  Labs out of range:  Serum creatinine needed, pt is 5 months overdue for depression follow-up visit, last PHQ-9 is > 5.    Charlene Calvo RN

## 2018-09-11 NOTE — TELEPHONE ENCOUNTER
"  Requested Prescriptions   Pending Prescriptions Disp Refills     DULoxetine (CYMBALTA) 30 MG EC capsule [Pharmacy Med Name: DULOXETINE HCL 30MG CPEP] 180 capsule 0     Sig: TAKE ONE CAPSULE BY MOUTH TWICE A DAY    Serotonin-Norepinephrine Reuptake Inhibitors  Failed    9/7/2018 12:57 PM       Failed - PHQ-9 score of less than 5 in past 6 months    Please review last PHQ-9 score.        Failed - Recent (6 mo) or future (30 days) visit within the authorizing provider's specialty    Patient had office visit in the last 6 months or has a visit in the next 30 days with authorizing provider or within the authorizing provider's specialty.  See \"Patient Info\" tab in inbasket, or \"Choose Columns\" in Meds & Orders section of the refill encounter.         Passed - Blood pressure under 140/90 in past 12 months    BP Readings from Last 3 Encounters:   10/30/17 102/65   09/07/17 125/82   07/27/17 126/78          Passed - Patient is age 18 or older        lisinopril (PRINIVIL/ZESTRIL) 10 MG tablet [Pharmacy Med Name: LISINOPRIL 10MG TABS] 90 tablet 0     Sig: TAKE ONE TABLET BY MOUTH EVERY DAY    ACE Inhibitors (Including Combos) Protocol Failed    9/7/2018 12:57 PM       Failed - Normal serum creatinine on file in past 12 months    Recent Labs   Lab Test  07/27/17   1214   CR  0.90          Failed - Normal serum potassium on file in past 12 months    Recent Labs   Lab Test  07/27/17   1214   POTASSIUM  4.7          Passed - Blood pressure under 140/90 in past 12 months    BP Readings from Last 3 Encounters:   10/30/17 102/65   09/07/17 125/82   07/27/17 126/78          Passed - Recent (12 mo) or future (30 days) visit within the authorizing provider's specialty    Patient had office visit in the last 12 months or has a visit in the next 30 days with authorizing provider or within the authorizing provider's specialty.  See \"Patient Info\" tab in inbasket, or \"Choose Columns\" in Meds & Orders section of the refill encounter.      "    Passed - Patient is age 18 or older

## 2019-02-14 ENCOUNTER — TELEPHONE (OUTPATIENT)
Dept: FAMILY MEDICINE | Facility: CLINIC | Age: 59
End: 2019-02-14

## 2019-02-14 DIAGNOSIS — Z72.0 TOBACCO ABUSE: ICD-10-CM

## 2019-02-14 DIAGNOSIS — Z91.09 ENVIRONMENTAL ALLERGIES: ICD-10-CM

## 2019-02-14 DIAGNOSIS — E78.00 HIGH BLOOD CHOLESTEROL: ICD-10-CM

## 2019-02-14 DIAGNOSIS — K21.9 GASTROESOPHAGEAL REFLUX DISEASE, ESOPHAGITIS PRESENCE NOT SPECIFIED: ICD-10-CM

## 2019-02-14 DIAGNOSIS — N40.1 BENIGN PROSTATIC HYPERPLASIA WITH LOWER URINARY TRACT SYMPTOMS, SYMPTOM DETAILS UNSPECIFIED: Primary | ICD-10-CM

## 2019-02-14 NOTE — TELEPHONE ENCOUNTER
This is a Dr. Torrez patient.  Last OV:  10/30/17 Dr. Torrez.  Routing refill requests to Dr. Torrez.   Patient has 2/22/19 pending appointment with Dr. Torrez.

## 2019-02-14 NOTE — TELEPHONE ENCOUNTER
Patient's insurance [facility] [place] PM[place] doesn't cover olopatadine.    Preferred alternatives are ketotifen 0.025% or azelastine 0.05%.    New order for one of those or pursue prior authorization for the olopatadine?      Ric Irby [facility], [facility] [phone]

## 2019-02-15 RX ORDER — DOXAZOSIN 8 MG/1
TABLET ORAL
Qty: 90 TABLET | Refills: 3 | Status: SHIPPED | OUTPATIENT
Start: 2019-02-15 | End: 2019-11-20

## 2019-02-15 RX ORDER — CETIRIZINE HYDROCHLORIDE 10 MG/1
TABLET ORAL
Qty: 90 TABLET | Refills: 3 | Status: SHIPPED | OUTPATIENT
Start: 2019-02-15 | End: 2019-11-20

## 2019-02-15 RX ORDER — ATORVASTATIN CALCIUM 40 MG/1
TABLET, FILM COATED ORAL
Qty: 90 TABLET | Refills: 3 | Status: SHIPPED | OUTPATIENT
Start: 2019-02-15 | End: 2019-11-20

## 2019-02-15 RX ORDER — VARENICLINE TARTRATE 1 MG/1
TABLET, FILM COATED ORAL
Qty: 56 TABLET | Refills: 2 | Status: SHIPPED | OUTPATIENT
Start: 2019-02-15 | End: 2019-11-19

## 2019-02-15 RX ORDER — MONTELUKAST SODIUM 10 MG/1
TABLET ORAL
Qty: 90 TABLET | Refills: 3 | Status: SHIPPED | OUTPATIENT
Start: 2019-02-15 | End: 2019-11-20

## 2019-02-23 NOTE — TELEPHONE ENCOUNTER
Not covered by ins. Please apply for PA or change med. First request was made on 2/14/19 with no response.   Thank you  Alecia [facility]oper [facility]  [phone]

## 2019-02-25 NOTE — TELEPHONE ENCOUNTER
Please let pharmacy know that I was waiting to see him in clinic but he no showed or cancelled.    I will address it when I see him in clinic if he reschedules.    In the meantime, the rx is denied.    Patel Torrez M.D.

## 2019-04-17 DIAGNOSIS — N40.1 BENIGN PROSTATIC HYPERPLASIA WITH LOWER URINARY TRACT SYMPTOMS, SYMPTOM DETAILS UNSPECIFIED: ICD-10-CM

## 2019-04-17 DIAGNOSIS — Z91.09 ENVIRONMENTAL ALLERGIES: Primary | ICD-10-CM

## 2019-04-17 DIAGNOSIS — J45.40 MODERATE PERSISTENT ASTHMA WITHOUT COMPLICATION: ICD-10-CM

## 2019-04-17 DIAGNOSIS — I10 ESSENTIAL HYPERTENSION WITH GOAL BLOOD PRESSURE LESS THAN 140/90: ICD-10-CM

## 2019-04-17 RX ORDER — FLUTICASONE PROPIONATE 50 MCG
SPRAY, SUSPENSION (ML) NASAL
Qty: 1 G | Refills: 0 | OUTPATIENT
Start: 2019-04-17

## 2019-04-17 NOTE — TELEPHONE ENCOUNTER
Routing refill request to provider for review/approval because:    Patient has not seen PCP since 2017.  He no-showed PCP appointment in March.     No appointment pending at this time.  Routing to provider to advise.    Tegan Bustos RN, BSN

## 2019-04-17 NOTE — TELEPHONE ENCOUNTER
Patient has not been seen since 10/30/2017.        TC - Please contact patient and assist them in making a follow up appointment and then route to provider for possible refill until then.  Thank you.  Nakia Estrada R.N.

## 2019-04-18 RX ORDER — DOXAZOSIN 8 MG/1
TABLET ORAL
Qty: 30 TABLET | Refills: 0 | OUTPATIENT
Start: 2019-04-18

## 2019-04-18 RX ORDER — ALBUTEROL SULFATE 90 UG/1
AEROSOL, METERED RESPIRATORY (INHALATION)
Qty: 18 G | Refills: 1 | OUTPATIENT
Start: 2019-04-18

## 2019-04-18 RX ORDER — LISINOPRIL 10 MG/1
TABLET ORAL
Qty: 90 TABLET | Refills: 0 | OUTPATIENT
Start: 2019-04-18

## 2019-04-18 RX ORDER — BUDESONIDE AND FORMOTEROL FUMARATE DIHYDRATE 160; 4.5 UG/1; UG/1
AEROSOL RESPIRATORY (INHALATION)
Qty: 10.2 G | Refills: 1 | OUTPATIENT
Start: 2019-04-18

## 2019-04-18 NOTE — TELEPHONE ENCOUNTER
Pt has not been seen since 10-30-17. Pt no showed last scheduled appointment.  To provider to advise.  Dunia LLOYDN, RN

## 2019-05-13 DIAGNOSIS — G89.29 CHRONIC NECK PAIN: ICD-10-CM

## 2019-05-13 DIAGNOSIS — J45.40 MODERATE PERSISTENT ASTHMA WITHOUT COMPLICATION: ICD-10-CM

## 2019-05-13 DIAGNOSIS — M54.2 CHRONIC NECK PAIN: ICD-10-CM

## 2019-05-13 NOTE — TELEPHONE ENCOUNTER
"Routing refill request to provider for review/approval because:  Drug not on the [facility] refill protocol  Gabapentin.    Requested Prescriptions   Pending Prescriptions Disp Refills     VENTOLIN  (90 Base) MCG/ACT inhaler [Pharmacy Med Name: VENTOLIN HFA 108MCG/ACT AERS] 18 g 1     Sig: INHALE 2 PUFFS INTO THE LUNGS EVERY 4 HOURS AS NEEDED FOR SHORTNESS OF BREATH, DIFFICULTY BREATHING OR WHEEZING.       Asthma Maintenance Inhalers - Anticholinergics Failed - 5/13/2019  9:50 AM        Failed - Asthma control assessment score within normal limits in last 6 months     Please review ACT score.           Failed - Recent (6 mo) or future (30 days) visit within the authorizing provider's specialty     Patient had office visit in the last 6 months or has a visit in the next 30 days with authorizing provider or within the authorizing provider's specialty.  See \"Patient Info\" tab in inbasket, or \"Choose Columns\" in Meds & Orders section of the refill encounter.            Passed - Patient is age 12 years or older        Passed - Medication is active on med list        gabapentin (NEURONTIN) 300 MG capsule [Pharmacy Med Name: GABAPENTIN 300MG CAPS] 360 capsule 3     Sig: TAKE TWO CAPSULES BY MOUTH TWICE A DAY       There is no refill protocol information for this order              "

## 2019-05-13 NOTE — LETTER
May 14, 2019    Marin Mcneil  [address] [place] [address]    Dear Marin,       We recently received a refill request for ventolin and gabapentin.  We were unable to refill because you are due for a:    Office visit, it has been more than 1 year since you have been seen.      Please call at your earliest convenience so that there will not be a delay with your future refills.          Thank you,   Your [facility] Team/  [phone]

## 2019-05-14 RX ORDER — ALBUTEROL SULFATE 90 UG/1
AEROSOL, METERED RESPIRATORY (INHALATION)
Qty: 18 G | Refills: 1 | OUTPATIENT
Start: 2019-05-14

## 2019-05-14 RX ORDER — GABAPENTIN 300 MG/1
CAPSULE ORAL
Qty: 360 CAPSULE | Refills: 3 | OUTPATIENT
Start: 2019-05-14

## 2019-05-14 NOTE — TELEPHONE ENCOUNTER
Provider notes from 4/17/19:    Patel Torrez MD     10:13 AM   Note      Same answer as before. Not able to refill until visit in clinic.     Patel Torrez M.D              RN again declined refills.  TC, please send letter to patient that we will not refill his gababpentin or ventolin inhaler until he is seen.     Tegan Bustos RN, BSN

## 2019-06-17 PROBLEM — J30.2 SEASONAL ALLERGIC RHINITIS: Status: ACTIVE | Noted: 2018-08-10

## 2019-07-24 DIAGNOSIS — M54.2 CHRONIC NECK PAIN: ICD-10-CM

## 2019-07-24 DIAGNOSIS — F33.0 MAJOR DEPRESSIVE DISORDER, RECURRENT EPISODE, MILD (H): ICD-10-CM

## 2019-07-24 DIAGNOSIS — G89.29 CHRONIC NECK PAIN: ICD-10-CM

## 2019-07-25 RX ORDER — GABAPENTIN 300 MG/1
CAPSULE ORAL
Qty: 360 CAPSULE | Refills: 3 | OUTPATIENT
Start: 2019-07-25

## 2019-07-25 RX ORDER — DULOXETIN HYDROCHLORIDE 30 MG/1
CAPSULE, DELAYED RELEASE ORAL
Qty: 180 CAPSULE | Refills: 0 | OUTPATIENT
Start: 2019-07-25

## 2019-10-08 DIAGNOSIS — G89.29 CHRONIC NECK PAIN: ICD-10-CM

## 2019-10-08 DIAGNOSIS — M54.2 CHRONIC NECK PAIN: ICD-10-CM

## 2019-10-08 DIAGNOSIS — J45.40 MODERATE PERSISTENT ASTHMA WITHOUT COMPLICATION: ICD-10-CM

## 2019-10-08 DIAGNOSIS — S16.1XXD CERVICAL STRAIN, SUBSEQUENT ENCOUNTER: ICD-10-CM

## 2019-10-09 RX ORDER — METHOCARBAMOL 500 MG/1
TABLET, FILM COATED ORAL
Qty: 180 TABLET | Refills: 2 | Status: SHIPPED | OUTPATIENT
Start: 2019-10-09 | End: 2019-11-19

## 2019-10-09 RX ORDER — GABAPENTIN 300 MG/1
CAPSULE ORAL
Qty: 360 CAPSULE | Refills: 3 | Status: SHIPPED | OUTPATIENT
Start: 2019-10-09 | End: 2019-11-19

## 2019-10-09 RX ORDER — ALBUTEROL SULFATE 90 UG/1
AEROSOL, METERED RESPIRATORY (INHALATION)
Qty: 18 G | Refills: 0 | Status: SHIPPED | OUTPATIENT
Start: 2019-10-09 | End: 2019-11-12

## 2019-10-09 NOTE — TELEPHONE ENCOUNTER
Next 5 appointments (look out 90 days)    Nov 19, 2019  2:20 PM CST  PHYSICAL with Patel [facility]rez [facility]  [facility] [address] [facility] [address] [phone]          Routing refill request to provider for review/approval because:  Drugs not on the [facility] refill protocol   Gabapentin and methocarbamol.    Dunia Rahman BSN, RN

## 2019-11-12 DIAGNOSIS — J45.40 MODERATE PERSISTENT ASTHMA WITHOUT COMPLICATION: ICD-10-CM

## 2019-11-12 RX ORDER — ALBUTEROL SULFATE 90 UG/1
AEROSOL, METERED RESPIRATORY (INHALATION)
Qty: 18 G | Refills: 0 | Status: SHIPPED | OUTPATIENT
Start: 2019-11-12 | End: 2019-12-16

## 2019-11-12 NOTE — TELEPHONE ENCOUNTER
Next 5 appointments (look out 90 days)    Nov 19, 2019  2:20 PM CST  PHYSICAL with Patel [facility]rez MD  [facility] [address] [facility] [address] [phone]        Need to keep upcoming appointment for further refills  Prescription approved per [facility] Refill Protocol #30  Sarah Patel RN

## 2019-11-14 ENCOUNTER — TELEPHONE (OUTPATIENT)
Dept: FAMILY MEDICINE | Facility: CLINIC | Age: 59
End: 2019-11-14

## 2019-11-14 NOTE — TELEPHONE ENCOUNTER
Problem: Dysphagia (Adult)  Goal: *Acute Goals and Plan of Care (Insert Text)  Description  Dysphagia goals initiated 10-30-19  1.) tolerate cardiac regular, thins diet without s/s of aspiration by 11-1-19  2.) consider MBS if indicated per family and MD   Outcome: Progressing Towards Goal     SPEECH LANGUAGE PATHOLOGY DYSPHAGIA TREATMENT  Patient: Rosalea Cogan (77 y.o. male)  Date: 10/31/2019  Diagnosis: Acute respiratory failure with hypoxia and hypercapnia ([facility] [J96.01, J96.02] <principal problem not specified>       Precautions: aspiration Fall, DNR    ASSESSMENT:  Patient presents with improved swallow function and decreased lethargy. No overt s/s of aspiration were noted during trials. He is impulsive at times with feedings, but is able to manage the bolus well. Wheezing was noted throughout eval, but per daughter and OT/PT this was his baseline this morning. Continue with diet. PLAN:  Recommendations and Planned Interventions:  Continue regular diet, thins   SLP will f/u x1  Patient continues to benefit from skilled intervention to address the above impairments. Continue treatment per established plan of care. Discharge Recommendations: To Be Determined     SUBJECTIVE:   Patient stated I feel much better today. OBJECTIVE:   Cognitive and Communication Status:  Neurologic State: Alert  Orientation Level: Oriented X4  Cognition: Follows commands, Appropriate decision making  Perception: Appears intact  Perseveration: No perseveration noted  Safety/Judgement: Awareness of environment, Insight into deficits  Dysphagia Treatment:  Oral Assessment:     P.O. Trials:  Patient Position: patient sitting upright in bed  Vocal quality prior to P.O.: Low volume;Hoarse  Consistency Presented: Solid; Thin liquid;Mixed consistency  How Presented: Self-fed/presented;Spoon;Straw  How Much: (1/2 sandwich; 1 fruit cup; 3 sips of water)    ORAL PHASE:  Bolus Acceptance: No impairment(impulsive)  B[facility] Prior Authorization Team Request    Medication: Omeprazole 20mg  Dosing: One capsule daily  NDC (required for Medicaid members): [address]-10    Insurance   BIN: [id number]  PCN: [id number]  [facility]  ID: [id number]    CoverMyMeds Key (if applicable):     Additional documentation: Insurance will pay for max of 120 capsules per year and then requires prior authorizaion    Filling Pharmacy: [facility]  Phone Number: [phone]  Contact:  PHARM JUAN PA (P [phone]) please send all responses to this pool.  Pharmacy NPI (required for Medicaid members): [id number]    Fang Wilson-Technician  [facility]  [phone]     Formation/Control: No impairment     Propulsion: No impairment  Oral Residue: None    PHARYNGEAL PHASE:  Initiation of Swallow: No impairment  Laryngeal Elevation: Functional  Aspiration Signs/Symptoms: Clear throat  Pharyngeal Phase Characteristics: No impairment, issues, or problems   Effective Modifications: None  Cues for Modifications: None                                                                                                                                             After treatment:   ?              Patient left in no apparent distress sitting up in chair  ? Patient left in no apparent distress in bed  ? Call bell left within reach  ? Nursing notified  ? Caregiver present  ? Bed alarm activated    COMMUNICATION/EDUCATION:   Patient and daughter were educated regarding SLP visit and they both verbalized understanding. The patients plan of care including recommendations, planned interventions, and recommended diet changes were discussed with: Registered Nurse. ? Posted safety precautions in patient's room. Lum Blight  Time Calculation: 25 mins          Regarding student involvement in patient care:  A student participated in this treatment session. Per CMS Medicare statements and MARIBELL guidelines I certify that the following was true:  1. I was present and directly observed the entire session. 2. I made all skilled judgments and clinical decisions regarding care. 3. I am the practitioner responsible for assessment, treatment, and documentation.

## 2019-11-17 ENCOUNTER — APPOINTMENT (OUTPATIENT)
Dept: CT IMAGING | Facility: CLINIC | Age: 59
End: 2019-11-17
Attending: EMERGENCY MEDICINE
Payer: COMMERCIAL

## 2019-11-17 ENCOUNTER — HOSPITAL ENCOUNTER (EMERGENCY)
Facility: CLINIC | Age: 59
Discharge: HOME | End: 2019-11-17
Attending: EMERGENCY MEDICINE | Admitting: EMERGENCY MEDICINE
Payer: COMMERCIAL

## 2019-11-17 VITALS
HEART RATE: 98 BPM | DIASTOLIC BLOOD PRESSURE: 88 MMHG | HEIGHT: 70 IN | BODY MASS INDEX: 25.77 KG/M2 | OXYGEN SATURATION: 96 % | TEMPERATURE: 98.6 F | WEIGHT: 180 LBS | SYSTOLIC BLOOD PRESSURE: 144 MMHG | RESPIRATION RATE: 18 BRPM

## 2019-11-17 DIAGNOSIS — S06.0X0A CONCUSSION WITHOUT LOSS OF CONSCIOUSNESS, INITIAL ENCOUNTER: ICD-10-CM

## 2019-11-17 DIAGNOSIS — S02.30XA ORBITAL FLOOR (BLOW-OUT) CLOSED FRACTURE (H): ICD-10-CM
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
## 2019-11-17 DIAGNOSIS — F10.929 ALCOHOLIC INTOXICATION WITH COMPLICATION (H): ICD-10-CM

## 2019-11-17 DIAGNOSIS — S09.90XA CLOSED HEAD INJURY, INITIAL ENCOUNTER: ICD-10-CM

## 2019-11-17 LAB
ALBUMIN SERPL-MCNC: 3.7 G/DL (ref 3.4–5)
ALP SERPL-CCNC: 104 U/L (ref 40–150)
ALT SERPL W P-5'-P-CCNC: 28 U/L (ref 0–70)
ANION GAP SERPL CALCULATED.3IONS-SCNC: 6 MMOL/L (ref 3–14)
AST SERPL W P-5'-P-CCNC: 32 U/L (ref 0–45)
BASOPHILS # BLD AUTO: 0 10E9/L (ref 0–0.2)
BASOPHILS NFR BLD AUTO: 0.3 %
BILIRUB SERPL-MCNC: 0.5 MG/DL (ref 0.2–1.3)
BUN SERPL-MCNC: 14 MG/DL (ref 7–30)
CALCIUM SERPL-MCNC: 8.5 MG/DL (ref 8.5–10.1)
CHLORIDE SERPL-SCNC: 113 MMOL/L (ref 94–109)
CO2 SERPL-SCNC: 26 MMOL/L (ref 20–32)
CREAT SERPL-MCNC: 0.84 MG/DL (ref 0.66–1.25)
DIFFERENTIAL METHOD BLD: ABNORMAL
EOSINOPHIL # BLD AUTO: 0 10E9/L (ref 0–0.7)
EOSINOPHIL NFR BLD AUTO: 0.3 %
ERYTHROCYTE [DISTWIDTH] IN BLOOD BY AUTOMATED COUNT: 13 % (ref 10–15)
ETHANOL SERPL-MCNC: 0.24 G/DL
GFR SERPL CREATININE-BSD FRML MDRD: >90 ML/MIN/{1.73_M2}
GLUCOSE SERPL-MCNC: 102 MG/DL (ref 70–99)
HCT VFR BLD AUTO: 44.1 % (ref 40–53)
HGB BLD-MCNC: 14.6 G/DL (ref 13.3–17.7)
IMM GRANULOCYTES # BLD: 0.1 10E9/L (ref 0–0.4)
IMM GRANULOCYTES NFR BLD: 0.8 %
INR PPP: 0.99 (ref 0.86–1.14)
LYMPHOCYTES # BLD AUTO: 1.6 10E9/L (ref 0.8–5.3)
LYMPHOCYTES NFR BLD AUTO: 10.9 %
MCH RBC QN AUTO: 29.4 PG (ref 26.5–33)
MCHC RBC AUTO-ENTMCNC: 33.1 G/DL (ref 31.5–36.5)
MCV RBC AUTO: 89 FL (ref 78–100)
MONOCYTES # BLD AUTO: 1 10E9/L (ref 0–1.3)
MONOCYTES NFR BLD AUTO: 7.2 %
NEUTROPHILS # BLD AUTO: 11.5 10E9/L (ref 1.6–8.3)
NEUTROPHILS NFR BLD AUTO: 80.5 %
NRBC # BLD AUTO: 0 10*3/UL
NRBC BLD AUTO-RTO: 0 /100
PLATELET # BLD AUTO: 262 10E9/L (ref 150–450)
POTASSIUM SERPL-SCNC: 3.8 MMOL/L (ref 3.4–5.3)
PROT SERPL-MCNC: 7.2 G/DL (ref 6.8–8.8)
RBC # BLD AUTO: 4.96 10E12/L (ref 4.4–5.9)
SODIUM SERPL-SCNC: 145 MMOL/L (ref 133–144)
WBC # BLD AUTO: 14.3 10E9/L (ref 4–11)

## 2019-11-17 PROCEDURE — 99284 EMERGENCY DEPT VISIT MOD MDM: CPT | Mod: 25

## 2019-11-17 PROCEDURE — 85610 PROTHROMBIN TIME: CPT | Performed by: EMERGENCY MEDICINE

## 2019-11-17 PROCEDURE — 85025 COMPLETE CBC W/AUTO DIFF WBC: CPT | Performed by: EMERGENCY MEDICINE

## 2019-11-17 PROCEDURE — 70486 CT MAXILLOFACIAL W/O DYE: CPT

## 2019-11-17 PROCEDURE — 70450 CT HEAD/BRAIN W/O DYE: CPT

## 2019-11-17 PROCEDURE — 99284 EMERGENCY DEPT VISIT MOD MDM: CPT | Mod: Z6 | Performed by: EMERGENCY MEDICINE

## 2019-11-17 PROCEDURE — 25000132 ZZH RX MED GY IP 250 OP 250 PS 637: Performed by: EMERGENCY MEDICINE

## 2019-11-17 PROCEDURE — 80320 DRUG SCREEN QUANTALCOHOLS: CPT | Performed by: EMERGENCY MEDICINE

## 2019-11-17 PROCEDURE — 72125 CT NECK SPINE W/O DYE: CPT

## 2019-11-17 PROCEDURE — 80053 COMPREHEN METABOLIC PANEL: CPT | Performed by: EMERGENCY MEDICINE

## 2019-11-17 RX ORDER — PANTOPRAZOLE SODIUM 40 MG/1
40 TABLET, DELAYED RELEASE ORAL ONCE
Status: COMPLETED | OUTPATIENT
Start: 2019-11-17 | End: 2019-11-17

## 2019-11-17 RX ADMIN — PANTOPRAZOLE SODIUM 40 MG: 40 TABLET, DELAYED RELEASE ORAL at 20:11

## 2019-11-17 ASSESSMENT — MIFFLIN-ST. JEOR: SCORE: 1627.72

## 2019-11-17 NOTE — ED AVS SNAPSHOT
[facility]  [address] [address]  Phone:  [phone]  Fax:  [phone]                                    Marin Mcneil   MRN: [id number]    Department:  [facility]   Date of Visit:  11/17/2019           After Visit Summary Signature Page    I have received my discharge instructions, and my questions have been answered. I have discussed any challenges I see with this plan with the nurse or doctor.    ..........................................................................................................................................  Patient/Patient Representative Signature      ..........................................................................................................................................  Patient Representative Print Name and Relationship to Patient    ..................................................               ................................................  Date                                   Time    ..........................................................................................................................................  Reviewed by Signature/Title    ...................................................              ..............................................  Date                                               Time          22EPIC Rev 08/18

## 2019-11-18 NOTE — ED NOTES
Pt discharged home with significant other, pt able to walk without problems, Pt denies vision problems, no double vision or blurriness. Alert and oriented, calm and cooperative now.
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
## 2019-11-18 NOTE — ED NOTES
Pt takes neurontin for neuropathy, no difference in numbness or tingling in fingers and toes from baseline.
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
## 2019-11-18 NOTE — DISCHARGE INSTRUCTIONS
Do not take any aspirin, you may take Tylenol and ibuprofen for discomfort    Ice your face for 15 minutes at a time 4-5 times daily for the next few days    Follow-up ENT regarding orbital floor fracture    Return/recheck for progressive headache, vomiting, focal neurologic change, passing out or any other concern.    Stop drinking alcohol go to AA get a sponsor do the steps

## 2019-11-19 ENCOUNTER — OFFICE VISIT (OUTPATIENT)
Dept: FAMILY MEDICINE | Facility: CLINIC | Age: 59
End: 2019-11-19
Payer: COMMERCIAL

## 2019-11-19 VITALS
HEIGHT: 69 IN | OXYGEN SATURATION: 97 % | BODY MASS INDEX: 27.4 KG/M2 | TEMPERATURE: 98.5 F | DIASTOLIC BLOOD PRESSURE: 76 MMHG | SYSTOLIC BLOOD PRESSURE: 148 MMHG | WEIGHT: 185 LBS | HEART RATE: 86 BPM

## 2019-11-19 DIAGNOSIS — E78.5 DYSLIPIDEMIA: ICD-10-CM

## 2019-11-19 DIAGNOSIS — J45.40 MODERATE PERSISTENT ASTHMA WITHOUT COMPLICATION: ICD-10-CM

## 2019-11-19 DIAGNOSIS — N40.1 BENIGN PROSTATIC HYPERPLASIA WITH LOWER URINARY TRACT SYMPTOMS, SYMPTOM DETAILS UNSPECIFIED: ICD-10-CM

## 2019-11-19 DIAGNOSIS — Z23 NEED FOR PROPHYLACTIC VACCINATION AND INOCULATION AGAINST INFLUENZA: ICD-10-CM

## 2019-11-19 DIAGNOSIS — Z91.09 ENVIRONMENTAL ALLERGIES: ICD-10-CM

## 2019-11-19 DIAGNOSIS — J30.2 SEASONAL ALLERGIES: ICD-10-CM

## 2019-11-19 DIAGNOSIS — Z00.00 ROUTINE GENERAL MEDICAL EXAMINATION AT A HEALTH CARE FACILITY: Primary | ICD-10-CM

## 2019-11-19 DIAGNOSIS — Z23 NEED FOR VACCINATION: ICD-10-CM

## 2019-11-19 DIAGNOSIS — K52.9: ICD-10-CM

## 2019-11-19 DIAGNOSIS — D72.829 LEUKOCYTOSIS, UNSPECIFIED TYPE: ICD-10-CM

## 2019-11-19 PROCEDURE — 90472 IMMUNIZATION ADMIN EACH ADD: CPT | Performed by: FAMILY MEDICINE

## 2019-11-19 PROCEDURE — 99213 OFFICE O/P EST LOW 20 MIN: CPT | Mod: 25 | Performed by: FAMILY MEDICINE

## 2019-11-19 PROCEDURE — 90682 RIV4 VACC RECOMBINANT DNA IM: CPT | Performed by: FAMILY MEDICINE

## 2019-11-19 PROCEDURE — 90670 PCV13 VACCINE IM: CPT | Performed by: FAMILY MEDICINE

## 2019-11-19 PROCEDURE — 90471 IMMUNIZATION ADMIN: CPT | Performed by: FAMILY MEDICINE

## 2019-11-19 PROCEDURE — 99396 PREV VISIT EST AGE 40-64: CPT | Mod: 25 | Performed by: FAMILY MEDICINE

## 2019-11-19 ASSESSMENT — PATIENT HEALTH QUESTIONNAIRE - PHQ9: SUM OF ALL RESPONSES TO PHQ QUESTIONS 1-9: 7

## 2019-11-19 ASSESSMENT — MIFFLIN-ST. JEOR: SCORE: 1634.53

## 2019-11-19 NOTE — PATIENT INSTRUCTIONS
1. I recommend including veggies, fresh fruits (3 to 5 servings), nuts (unsalted) in your daily diet. Cut down on carbs like bread, pasta, rice, potatoes. Cut down on red meats like burgers etc. Increase healthy proteins like beans, tofu, tuna, chicken and turkey.    2. Get regular exercise like jogging, biking, swimming at least 3 times per week (150 minutes per week).      3. Restart your medications.    4. Avoid the sun or otherwise use sun screen - follow with your dermatologist.    5. See the dentist and eye doctor regularly.     6. Please call [phone] to register for a class about advanced directive.     7. Stop by our pharmacy and get the Shingles vaccine.    8. Set up colonoscopy - [phone].    9. See you in one month.

## 2019-11-19 NOTE — TELEPHONE ENCOUNTER
PRIOR AUTHORIZATION DENIED    Medication: Omeprazole 20mg    Denial Date: 11/15/2019    Denial Rational:  Plan limits one tablet per day for a maximum of 120 days within 365.      Appeal Information:    If you would like to appeal, please supply P/A team with a letter of medical necessity with clinical reason.
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
## 2019-11-20 PROBLEM — J30.2 SEASONAL ALLERGIES: Status: ACTIVE | Noted: 2019-11-20

## 2019-11-20 RX ORDER — FLUTICASONE PROPIONATE 50 MCG
SPRAY, SUSPENSION (ML) NASAL
Qty: 16 G | Refills: 8 | Status: SHIPPED | OUTPATIENT
Start: 2019-11-20

## 2019-11-20 RX ORDER — CETIRIZINE HYDROCHLORIDE 10 MG/1
10 TABLET ORAL EVERY MORNING
Qty: 90 TABLET | Refills: 3 | Status: SHIPPED | OUTPATIENT
Start: 2019-11-20 | End: 2020-10-30

## 2019-11-20 RX ORDER — ATORVASTATIN CALCIUM 40 MG/1
40 TABLET, FILM COATED ORAL DAILY
Qty: 90 TABLET | Refills: 3 | Status: SHIPPED | OUTPATIENT
Start: 2019-11-20 | End: 2020-10-30

## 2019-11-20 RX ORDER — MONTELUKAST SODIUM 10 MG/1
TABLET ORAL
Qty: 90 TABLET | Refills: 3 | Status: SHIPPED | OUTPATIENT
Start: 2019-11-20 | End: 2020-10-30

## 2019-11-20 RX ORDER — BUPROPION HYDROCHLORIDE 150 MG/1
150 TABLET ORAL EVERY MORNING
Qty: 90 TABLET | Refills: 3 | Status: SHIPPED | OUTPATIENT
Start: 2019-11-20 | End: 2020-10-30

## 2019-11-20 RX ORDER — BUDESONIDE AND FORMOTEROL FUMARATE DIHYDRATE 160; 4.5 UG/1; UG/1
2 AEROSOL RESPIRATORY (INHALATION) 2 TIMES DAILY
Qty: 1 INHALER | Refills: 3 | Status: SHIPPED | OUTPATIENT
Start: 2019-11-20 | End: 2020-08-31

## 2019-11-20 RX ORDER — DOXAZOSIN 8 MG/1
TABLET ORAL
Qty: 90 TABLET | Refills: 3 | Status: SHIPPED | OUTPATIENT
Start: 2019-11-20 | End: 2020-10-30

## 2019-11-20 ASSESSMENT — ASTHMA QUESTIONNAIRES: ACT_TOTALSCORE: 16

## 2019-12-16 DIAGNOSIS — J45.40 MODERATE PERSISTENT ASTHMA WITHOUT COMPLICATION: ICD-10-CM

## 2019-12-17 RX ORDER — ALBUTEROL SULFATE 90 UG/1
AEROSOL, METERED RESPIRATORY (INHALATION)
Qty: 18 G | Refills: 0 | Status: SHIPPED | OUTPATIENT
Start: 2019-12-17

## 2019-12-17 RX ORDER — ALBUTEROL SULFATE 90 UG/1
AEROSOL, METERED RESPIRATORY (INHALATION)
Qty: 18 G | Refills: 0 | Status: SHIPPED | OUTPATIENT
Start: 2019-12-17 | End: 2020-02-05

## 2019-12-17 NOTE — TELEPHONE ENCOUNTER
"Routing refill request to provider for review/approval because:  ACT is not at goal      ACT Total Scores 5/8/2017 10/30/2017 11/19/2019   ACT TOTAL SCORE - - -   ASTHMA ER VISITS - - -   ASTHMA HOSPITALIZATIONS - - -   ACT TOTAL SCORE (Goal Greater than or Equal to 20) 15 20 16   In the past 12 months, how many times did you visit the emergency room for your asthma without being admitted to the hospital? 0 0 0   In the past 12 months, how many times were you hospitalized overnight because of your asthma? 0 0 0       Requested Prescriptions   Pending Prescriptions Disp Refills     albuterol (PROAIR HFA/PROVENTIL HFA/VENTOLIN HFA) 108 (90 Base) MCG/ACT inhaler [Pharmacy Med Name: ALBUTEROL SULFATE  AERS] 18 g 0     Sig: INHALE 2 PUFFS INTO THE LUNGS EVERY 4 HOURS AS NEEDED FOR SHORTNESS OF BREATH, DIFFICULTY BREATHING OR WHEEZING.       Asthma Maintenance Inhalers - Anticholinergics Failed - 12/16/2019  9:26 AM        Failed - Asthma control assessment score within normal limits in last 6 months     Please review ACT score.           Passed - Patient is age 12 years or older        Passed - Medication is active on med list        Passed - Recent (6 mo) or future (30 days) visit within the authorizing provider's specialty     Patient had office visit in the last 6 months or has a visit in the next 30 days with authorizing provider or within the authorizing provider's specialty.  See \"Patient Info\" tab in inbasket, or \"Choose Columns\" in Meds & Orders section of the refill encounter.            albuterol (PROAIR HFA/PROVENTIL HFA/VENTOLIN HFA) 108 (90 Base) MCG/ACT inhaler [Pharmacy Med Name: ALBUTEROL SULFATE  AERS] 18 g 0     Sig: INHALE 2 PUFFS INTO THE LUNGS EVERY 4 HOURS AS NEEDED FOR SHORTNESS OF BREATH, DIFFICULTY BREATHING OR WHEEZING.       Asthma Maintenance Inhalers - Anticholinergics Failed - 12/16/2019  9:26 AM        Failed - Asthma control assessment score within normal limits in last 6 " "months     Please review ACT score.           Passed - Patient is age 12 years or older        Passed - Medication is active on med list        Passed - Recent (6 mo) or future (30 days) visit within the authorizing provider's specialty     Patient had office visit in the last 6 months or has a visit in the next 30 days with authorizing provider or within the authorizing provider's specialty.  See \"Patient Info\" tab in inbasket, or \"Choose Columns\" in Meds & Orders section of the refill encounter.            DEONTE Hinton, RN    "

## 2019-12-19 ENCOUNTER — HOSPITAL ENCOUNTER (OUTPATIENT)
Facility: AMBULATORY SURGERY CENTER | Age: 59
Discharge: HOME | End: 2019-12-19
Attending: SURGERY | Admitting: SURGERY
Payer: COMMERCIAL

## 2019-12-19 VITALS
DIASTOLIC BLOOD PRESSURE: 79 MMHG | OXYGEN SATURATION: 94 % | SYSTOLIC BLOOD PRESSURE: 125 MMHG | TEMPERATURE: 97.3 F | RESPIRATION RATE: 16 BRPM | HEART RATE: 67 BPM

## 2019-12-19 LAB — COLONOSCOPY: NORMAL

## 2019-12-19 PROCEDURE — 45385 COLONOSCOPY W/LESION REMOVAL: CPT | Mod: PT | Performed by: SURGERY

## 2019-12-19 PROCEDURE — 45385 COLONOSCOPY W/LESION REMOVAL: CPT

## 2019-12-19 PROCEDURE — G8918 PT W/O PREOP ORDER IV AB PRO: HCPCS

## 2019-12-19 PROCEDURE — 99153 MOD SED SAME PHYS/QHP EA: CPT | Mod: 59 | Performed by: SURGERY

## 2019-12-19 PROCEDURE — 88305 TISSUE EXAM BY PATHOLOGIST: CPT | Performed by: SURGERY

## 2019-12-19 PROCEDURE — 99152 MOD SED SAME PHYS/QHP 5/>YRS: CPT | Mod: 59 | Performed by: SURGERY

## 2019-12-19 PROCEDURE — G8907 PT DOC NO EVENTS ON DISCHARG: HCPCS

## 2019-12-19 RX ORDER — ONDANSETRON 2 MG/ML
4 INJECTION INTRAMUSCULAR; INTRAVENOUS EVERY 6 HOURS PRN
Status: DISCONTINUED | OUTPATIENT
Start: 2019-12-19 | End: 2019-12-20 | Stop reason: HOSPADM

## 2019-12-19 RX ORDER — FLUMAZENIL 0.1 MG/ML
0.2 INJECTION, SOLUTION INTRAVENOUS
Status: DISCONTINUED | OUTPATIENT
Start: 2019-12-19 | End: 2019-12-20 | Stop reason: HOSPADM

## 2019-12-19 RX ORDER — LIDOCAINE HYDROCHLORIDE 20 MG/ML
JELLY TOPICAL PRN
Status: DISCONTINUED | OUTPATIENT
Start: 2019-12-19 | End: 2019-12-19 | Stop reason: HOSPADM

## 2019-12-19 RX ORDER — LIDOCAINE 40 MG/G
CREAM TOPICAL
Status: DISCONTINUED | OUTPATIENT
Start: 2019-12-19 | End: 2019-12-20 | Stop reason: HOSPADM

## 2019-12-19 RX ORDER — DIPHENHYDRAMINE HYDROCHLORIDE 50 MG/ML
INJECTION INTRAMUSCULAR; INTRAVENOUS PRN
Status: DISCONTINUED | OUTPATIENT
Start: 2019-12-19 | End: 2019-12-19 | Stop reason: HOSPADM

## 2019-12-19 RX ORDER — ONDANSETRON 4 MG/1
4 TABLET, ORALLY DISINTEGRATING ORAL EVERY 6 HOURS PRN
Status: DISCONTINUED | OUTPATIENT
Start: 2019-12-19 | End: 2019-12-20 | Stop reason: HOSPADM

## 2019-12-19 RX ORDER — NALOXONE HYDROCHLORIDE 0.4 MG/ML
.1-.4 INJECTION, SOLUTION INTRAMUSCULAR; INTRAVENOUS; SUBCUTANEOUS
Status: DISCONTINUED | OUTPATIENT
Start: 2019-12-19 | End: 2019-12-20 | Stop reason: HOSPADM

## 2019-12-19 RX ORDER — FENTANYL CITRATE 50 UG/ML
INJECTION, SOLUTION INTRAMUSCULAR; INTRAVENOUS PRN
Status: DISCONTINUED | OUTPATIENT
Start: 2019-12-19 | End: 2019-12-19 | Stop reason: HOSPADM

## 2019-12-19 NOTE — LETTER
[facility]           [address] [place] [address]           Tel [phone]  Marinkisha Mcneil  [address] [place] [address]      December 27, 2019    Dear Marin,  This letter is to inform you of the results of your pathology report on your colonoscopy.  If you have questions please feel free to call my assistant  At [phone] .    Your pathology report was:  Showed an Adenomatous polyp. This is a benign (not cancerous) growth; however these can become cancer over time. This polyp is usually removed completely at the time of the biopsy. Because it is an Adenomatous polyp you do have a slight higher risk for colon cancer. This is why you will need a repeat colonoscopy in approximately 5 years.  If you do have further questions please don t hesitate to call my assistant at  .  We do not have someone answering the phone all the time at my assistants number so if leave a message may take a day or so to get back to you.  So if more urgent then call the below number.    To make an appointment call [phone]: .   Sincerely,     Octaviano Carrasco M.D.  ___

## 2019-12-19 NOTE — DISCHARGE INSTRUCTIONS
You will benefit from MAC.  In fact I will not do this with out MAC.  Make sure your doctor orders it with MAC.  This is just added to the orders and is just typed into the area where they ask questions about your taking blood thinners.  This is administered by anesthesia to make you more comfortable than I can do safely during your colonoscopy.  This is done with anesthesia.   You have a lot of sharp turns in your colon and ansesthesia may be able to keep you more comfortable.  You will need and History and physical for this.  Just remind your primary doctor about this when ordering your next colonoscopy.

## 2019-12-26 LAB — COPATH REPORT: NORMAL

## 2020-01-14 ENCOUNTER — TELEPHONE (OUTPATIENT)
Dept: FAMILY MEDICINE | Facility: CLINIC | Age: 60
End: 2020-01-14

## 2020-01-14 NOTE — TELEPHONE ENCOUNTER
[facility] Prior Authorization Team Request    Medication: omeprazole 20mg  Dosing:   NDC (required for Medicaid members):     Insurance   BIN: [id number]  PCN: [id number]  [id number]  ID: [id number]    CoverMyMeds Key (if applicable):     Additional documentation: max 120 days supply in 365 days--needs a prior auth    [facility]  Phone Number: [phone]  Contact: EDMAR PHARM JUAN PA (P [id number]) please send all responses to this pool.  Pharmacy NPI (required for Medicaid members):

## 2020-01-15 NOTE — TELEPHONE ENCOUNTER
Central Prior Authorization Team   Phone: [phone]      PA Initiation    Medication: omeprazole 20mg  Insurance Company: [employer] [place] - Phone [phone] Fax [phone]  Pharmacy Filling the Rx: [place], [place] - [address]   Filling Pharmacy Phone: [phone]  Filling Pharmacy Fax:    Start Date: 1/15/2020

## 2020-01-16 NOTE — TELEPHONE ENCOUNTER
PRIOR AUTHORIZATION DENIED    Medication: omeprazole 20mg    Denial Date: 1/16/2020    Denial Rational:  Plan limits one tablet per day for a maximum of 120 days within 365.      Appeal Information:    If you would like to appeal, please supply P/A team with a letter of medical necessity with clinical reason.

## 2020-02-05 DIAGNOSIS — J45.40 MODERATE PERSISTENT ASTHMA WITHOUT COMPLICATION: ICD-10-CM

## 2020-02-05 RX ORDER — ALBUTEROL SULFATE 90 UG/1
AEROSOL, METERED RESPIRATORY (INHALATION)
Qty: 18 G | Refills: 0 | Status: SHIPPED | OUTPATIENT
Start: 2020-02-05 | End: 2020-06-12

## 2020-02-05 NOTE — TELEPHONE ENCOUNTER
Medication is being filled for 1 time refill only due to:  Patient needs to be seen because due for follow up for asthma and allergies.   Dunia Rhaman BSN, RN

## 2020-02-05 NOTE — LETTER
February 5, 2020    Marin Mcneil  [address] [place] [address]    Dear Marin,       We recently received a refill request for albuterol (PROAIR HFA/PROVENTIL HFA/VENTOLIN HFA) 108 (90 Base) MCG/ACT inhaler.  We have refilled this for a one time 30 day supply only because you are due for a:    asthma office visit      Please call at your earliest convenience so that there will not be a delay with your future refills.          Thank you,   Your [facility] Team/[facility]  [phone]

## 2020-04-22 ENCOUNTER — TELEPHONE (OUTPATIENT)
Dept: FAMILY MEDICINE | Facility: CLINIC | Age: 60
End: 2020-04-22

## 2020-04-22 ENCOUNTER — VIRTUAL VISIT (OUTPATIENT)
Dept: FAMILY MEDICINE | Facility: CLINIC | Age: 60
End: 2020-04-22
Payer: COMMERCIAL

## 2020-04-22 DIAGNOSIS — Z91.09 ENVIRONMENTAL ALLERGIES: ICD-10-CM

## 2020-04-22 DIAGNOSIS — J45.40 MODERATE PERSISTENT ASTHMA WITHOUT COMPLICATION: ICD-10-CM

## 2020-04-22 PROCEDURE — 99213 OFFICE O/P EST LOW 20 MIN: CPT | Mod: 95 | Performed by: FAMILY MEDICINE

## 2020-04-22 RX ORDER — ALBUTEROL SULFATE 90 UG/1
AEROSOL, METERED RESPIRATORY (INHALATION)
Qty: 18 G | Refills: 0 | OUTPATIENT
Start: 2020-04-22

## 2020-04-22 NOTE — TELEPHONE ENCOUNTER
Reason for Call:  Other call back    Detailed comments: patient is calling to discuss seasonal allergies and bronchial asthma relief on top of what pcp already prescribes. Please call to discuss. Thank you.    Phone Number Patient can be reached at: Home number on file [phone] (home)    Best Time:     Can we leave a detailed message on this number? YES    Call taken on 4/22/2020 at 11:46 AM by Argenis Oneil
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
## 2020-05-14 ENCOUNTER — PATIENT OUTREACH (OUTPATIENT)
Dept: CARE COORDINATION | Facility: CLINIC | Age: 60
End: 2020-05-14

## 2020-05-14 DIAGNOSIS — Z71.89 COMPLEX CARE COORDINATION: Primary | ICD-10-CM

## 2020-05-14 NOTE — PROGRESS NOTES
Clinic Care Coordination Contact    Clinic Care Coordination Contact  OUTREACH    Referral Information:  Referral Source: Pro-Active Outreach    Payor request for Proactive Outreach due to COVID-19 risk     Primary Diagnosis: Psychosocial    Chief Complaint   Patient presents with     Clinic Care Coordination - Initial     SW        Universal Utilization: [facility]     Utilization    Last refreshed: 5/12/2020  8:01 PM:  Hospital Admissions 0           Last refreshed: 5/12/2020  8:01 PM:  ED Visits 1           Last refreshed: 5/1/2020  1:33 AM:  No Show Count (past year) 2              Current as of: 5/1/2020  1:33 AM              Clinical Concerns:  Current Medical Concerns:   Patient Active Problem List   Diagnosis     Mild major depression (H)     Alcoholism - last used approx 3/2012     Screen for colon cancer     Left shoulder pain     Environmental allergies     Rotator cuff tear     Partial tear of rotator cuff     Moderate persistent asthma     Nevus     Advanced directives, counseling/discussion     Major depressive disorder, recurrent episode, moderate (H)     Hypertrophy of prostate with urinary obstruction     Chronic neck pain     Cervical strain     Generalized anxiety disorder     Benign non-nodular prostatic hyperplasia with lower urinary tract symptoms     Essential hypertension with goal blood pressure less than 140/90     Dermatochalasis of both upper eyelids     Major depressive disorder, recurrent episode, mild (H)     Gastroesophageal reflux disease, esophagitis presence not specified     Dyslipidemia     Impingement syndrome of right shoulder     Right tennis elbow     Tobacco abuse     Cervical radiculopathy     Benign prostatic hyperplasia with lower urinary tract symptoms, symptom details unspecified     Seasonal allergic rhinitis, unspecified chronicity, unspecified trigger     Allergic conjunctivitis, bilateral     Seasonal allergies       Current Behavioral Concerns: Patient has dx of  Depression and generalized anxiety disorder.   Education Provided to patient: Introduced self and role      Health Maintenance Reviewed: Due/Overdue   Health Maintenance Due   Topic Date Due     HIV SCREENING  05/06/1973     ZOSTER IMMUNIZATION (1 of 2) 05/06/2008     URINE DRUG SCREEN  02/06/2018     ASTHMA ACTION PLAN  05/08/2018     LIPID  07/27/2018     PNEUMOCOCCAL IMMUNIZATION 19-64 MEDIUM RISK (1 of 1 - PPSV23) 01/14/2020     ASTHMA CONTROL TEST  05/19/2020     PHQ-9  05/19/2020       Clinical Pathway: None    Medication Management:  Did not discuss medications during this conversation      Functional Status:       Living Situation:       Lifestyle & Psychosocial Needs: [facility] contacted patient. Payor request for Proactive Outreach due to COVID-19 risk. Introduced self and role. Patient declined needing resources from [facility] at this time. Patient feels that he has his health care needs met and declined further follow up.          Socioeconomic History     Marital status: Single     Spouse name: Not on file     Number of children: Not on file     Years of education: Not on file     Highest education level: Not on file     Tobacco Use     Smoking status: Former Smoker     Types: Cigars     Smokeless tobacco: Current User     Types: Chew   Substance and Sexual Activity     Alcohol use: Yes     Comment: binge drinker, 5 beers and 5 shots. Haven't had any drinks for 3 weeks     Drug use: No     Sexual activity: Never        Resources and Interventions:  Current Resources:      Goals: No goals were created during this conversation d/t patient declining to have further CC follow up      Patient/Caregiver understanding: Yes    Outreach Frequency: monthly      Plan: Patient declined needed Care Coordination services. No follow up calls will be made. Patient is aware how to reach Care Coordination, if needed    NISHA Lutz  Primary Care Clinic- Social Work Care Coordinator  [facility]  [place]  Ph: [phone]  5/14/2020 3:32 PM

## 2020-06-09 DIAGNOSIS — J45.40 MODERATE PERSISTENT ASTHMA WITHOUT COMPLICATION: ICD-10-CM

## 2020-06-12 RX ORDER — ALBUTEROL SULFATE 90 UG/1
AEROSOL, METERED RESPIRATORY (INHALATION)
Qty: 18 G | Refills: 0 | Status: SHIPPED | OUTPATIENT
Start: 2020-06-12 | End: 2020-08-31

## 2020-06-12 NOTE — TELEPHONE ENCOUNTER
Routing refill request to provider for review/approval because:  ACT not up to date.  Dunia Rahman BSN, RN

## 2020-07-28 ENCOUNTER — TELEPHONE (OUTPATIENT)
Dept: FAMILY MEDICINE | Facility: CLINIC | Age: 60
End: 2020-07-28

## 2020-07-28 DIAGNOSIS — G89.29 CHRONIC NECK PAIN: Primary | ICD-10-CM

## 2020-07-28 DIAGNOSIS — M54.2 CHRONIC NECK PAIN: Primary | ICD-10-CM

## 2020-07-28 NOTE — TELEPHONE ENCOUNTER
Patient would like Tizanidine in place of Methocarbamol (not working)    To provider to advise    Miranda LLOYDN, RN, CPN

## 2020-08-28 DIAGNOSIS — J45.40 MODERATE PERSISTENT ASTHMA WITHOUT COMPLICATION: ICD-10-CM

## 2020-08-28 NOTE — TELEPHONE ENCOUNTER
"Requested Prescriptions   Pending Prescriptions Disp Refills     SYMBICORT 160-4.5 MCG/ACT Inhaler [Pharmacy Med Name: SYMBICORT 160-4.5MCG/ACT AERO] 10.2 g 3     Sig: INHALE 2 PUFFS INTO THE LUNGS TWO TIMES A DAY       Inhaled Steroids Protocol Failed - 8/28/2020  2:53 PM        Failed - Asthma control assessment score within normal limits in last 6 months     Please review ACT score.           Passed - Patient is age 12 or older        Passed - Medication is active on med list        Passed - Recent (6 mo) or future (30 days) visit within the authorizing provider's specialty     Patient had office visit in the last 6 months or has a visit in the next 30 days with authorizing provider or within the authorizing provider's specialty.  See \"Patient Info\" tab in inbasket, or \"Choose Columns\" in Meds & Orders section of the refill encounter.           Long-Acting Beta Agonist Inhalers Protocol  Failed - 8/28/2020  2:53 PM        Failed - Asthma control assessment score within normal limits in last 6 months     Please review ACT score.           Failed - Order for Serevent, Striverdi, or Foradil and pt has steroid inhaler        Passed - Patient is age 12 or older        Passed - Medication is active on med list        Passed - Recent (6 mo) or future (30 days) visit within the authorizing provider's specialty     Patient had office visit in the last 6 months or has a visit in the next 30 days with authorizing provider or within the authorizing provider's specialty.  See \"Patient Info\" tab in inbasket, or \"Choose Columns\" in Meds & Orders section of the refill encounter.               albuterol (PROAIR HFA/PROVENTIL HFA/VENTOLIN HFA) 108 (90 Base) MCG/ACT inhaler [Pharmacy Med Name: ALBUTEROL SULFATE  AERS] 18 g 0     Sig: INHALE 2 PUFFS INTO THE LUNGS EVERY 4 HOURS AS NEEDED FOR SHORTNESS OF BREATH, DIFFICULTY BREATHING OR WHEEZING.       Asthma Maintenance Inhalers - Anticholinergics Failed - 8/28/2020  2:53 PM " "       Failed - Asthma control assessment score within normal limits in last 6 months     Please review ACT score.           Passed - Patient is age 12 years or older        Passed - Medication is active on med list        Passed - Recent (6 mo) or future (30 days) visit within the authorizing provider's specialty     Patient had office visit in the last 6 months or has a visit in the next 30 days with authorizing provider or within the authorizing provider's specialty.  See \"Patient Info\" tab in inbasket, or \"Choose Columns\" in Meds & Orders section of the refill encounter.           Short-Acting Beta Agonist Inhalers Protocol  Failed - 8/28/2020  2:53 PM        Failed - Asthma control assessment score within normal limits in last 6 months     Please review ACT score.           Passed - Patient is age 12 or older        Passed - Medication is active on med list        Passed - Recent (6 mo) or future (30 days) visit within the authorizing provider's specialty     Patient had office visit in the last 6 months or has a visit in the next 30 days with authorizing provider or within the authorizing provider's specialty.  See \"Patient Info\" tab in inbasket, or \"Choose Columns\" in Meds & Orders section of the refill encounter.                 "

## 2020-08-31 RX ORDER — ALBUTEROL SULFATE 90 UG/1
AEROSOL, METERED RESPIRATORY (INHALATION)
Qty: 18 G | Refills: 0 | Status: SHIPPED | OUTPATIENT
Start: 2020-08-31 | End: 2020-10-02

## 2020-08-31 RX ORDER — BUDESONIDE AND FORMOTEROL FUMARATE DIHYDRATE 160; 4.5 UG/1; UG/1
AEROSOL RESPIRATORY (INHALATION)
Qty: 10.2 G | Refills: 3 | Status: SHIPPED | OUTPATIENT
Start: 2020-08-31 | End: 2020-10-30

## 2020-10-02 DIAGNOSIS — J45.40 MODERATE PERSISTENT ASTHMA WITHOUT COMPLICATION: ICD-10-CM

## 2020-10-02 RX ORDER — ALBUTEROL SULFATE 90 UG/1
AEROSOL, METERED RESPIRATORY (INHALATION)
Qty: 3 INHALER | Refills: 0 | Status: SHIPPED | OUTPATIENT
Start: 2020-10-02 | End: 2021-03-12

## 2020-10-02 NOTE — TELEPHONE ENCOUNTER
Next 5 appointments (look out 90 days)    Oct 30, 2020  2:00 PM  Office Visit with Patel Torrez MD  [facility] ([facility]) [address] [facility] [address] [phone]        Rx refilled.    Tegan Bustos BSN, RN

## 2020-10-23 DIAGNOSIS — E78.5 DYSLIPIDEMIA: ICD-10-CM

## 2020-10-23 DIAGNOSIS — D72.829 LEUKOCYTOSIS, UNSPECIFIED TYPE: ICD-10-CM

## 2020-10-23 DIAGNOSIS — Z00.00 ROUTINE GENERAL MEDICAL EXAMINATION AT A HEALTH CARE FACILITY: ICD-10-CM

## 2020-10-23 LAB
BASOPHILS # BLD AUTO: 0 10E9/L (ref 0–0.2)
BASOPHILS NFR BLD AUTO: 0.3 %
CHOLEST SERPL-MCNC: 175 MG/DL
DIFFERENTIAL METHOD BLD: NORMAL
EOSINOPHIL # BLD AUTO: 0.2 10E9/L (ref 0–0.7)
EOSINOPHIL NFR BLD AUTO: 1.5 %
ERYTHROCYTE [DISTWIDTH] IN BLOOD BY AUTOMATED COUNT: 11.9 % (ref 10–15)
HCT VFR BLD AUTO: 47.7 % (ref 40–53)
HDLC SERPL-MCNC: 88 MG/DL
HGB BLD-MCNC: 16.3 G/DL (ref 13.3–17.7)
LDLC SERPL CALC-MCNC: 71 MG/DL
LYMPHOCYTES # BLD AUTO: 1.7 10E9/L (ref 0.8–5.3)
LYMPHOCYTES NFR BLD AUTO: 17.9 %
MCH RBC QN AUTO: 31.3 PG (ref 26.5–33)
MCHC RBC AUTO-ENTMCNC: 34.2 G/DL (ref 31.5–36.5)
MCV RBC AUTO: 92 FL (ref 78–100)
MONOCYTES # BLD AUTO: 0.9 10E9/L (ref 0–1.3)
MONOCYTES NFR BLD AUTO: 9 %
NEUTROPHILS # BLD AUTO: 6.9 10E9/L (ref 1.6–8.3)
NEUTROPHILS NFR BLD AUTO: 71.3 %
NONHDLC SERPL-MCNC: 87 MG/DL
PLATELET # BLD AUTO: 291 10E9/L (ref 150–450)
PSA SERPL-ACNC: 1.71 UG/L (ref 0–4)
RBC # BLD AUTO: 5.2 10E12/L (ref 4.4–5.9)
TRIGL SERPL-MCNC: 80 MG/DL
WBC # BLD AUTO: 9.7 10E9/L (ref 4–11)

## 2020-10-23 PROCEDURE — 36415 COLL VENOUS BLD VENIPUNCTURE: CPT | Performed by: FAMILY MEDICINE

## 2020-10-23 PROCEDURE — G0103 PSA SCREENING: HCPCS | Performed by: FAMILY MEDICINE

## 2020-10-23 PROCEDURE — 80061 LIPID PANEL: CPT | Performed by: FAMILY MEDICINE

## 2020-10-23 PROCEDURE — 85025 COMPLETE CBC W/AUTO DIFF WBC: CPT | Performed by: FAMILY MEDICINE

## 2020-10-30 ENCOUNTER — OFFICE VISIT (OUTPATIENT)
Dept: FAMILY MEDICINE | Facility: CLINIC | Age: 60
End: 2020-10-30
Payer: COMMERCIAL

## 2020-10-30 VITALS
BODY MASS INDEX: 28.58 KG/M2 | TEMPERATURE: 98.1 F | WEIGHT: 193 LBS | DIASTOLIC BLOOD PRESSURE: 98 MMHG | HEIGHT: 69 IN | OXYGEN SATURATION: 96 % | SYSTOLIC BLOOD PRESSURE: 148 MMHG | HEART RATE: 81 BPM

## 2020-10-30 DIAGNOSIS — Z23 NEED FOR PROPHYLACTIC VACCINATION AND INOCULATION AGAINST INFLUENZA: ICD-10-CM

## 2020-10-30 DIAGNOSIS — J45.40 MODERATE PERSISTENT ASTHMA WITHOUT COMPLICATION: ICD-10-CM

## 2020-10-30 DIAGNOSIS — E78.5 DYSLIPIDEMIA: ICD-10-CM

## 2020-10-30 DIAGNOSIS — N40.1 BENIGN PROSTATIC HYPERPLASIA WITH LOWER URINARY TRACT SYMPTOMS, SYMPTOM DETAILS UNSPECIFIED: ICD-10-CM

## 2020-10-30 DIAGNOSIS — Z91.09 ENVIRONMENTAL ALLERGIES: ICD-10-CM

## 2020-10-30 DIAGNOSIS — D22.9 NUMEROUS SKIN MOLES: ICD-10-CM

## 2020-10-30 DIAGNOSIS — R09.81 NASAL CONGESTION: ICD-10-CM

## 2020-10-30 DIAGNOSIS — I10 ESSENTIAL HYPERTENSION WITH GOAL BLOOD PRESSURE LESS THAN 140/90: Primary | ICD-10-CM

## 2020-10-30 PROCEDURE — 90682 RIV4 VACC RECOMBINANT DNA IM: CPT | Performed by: FAMILY MEDICINE

## 2020-10-30 PROCEDURE — 99214 OFFICE O/P EST MOD 30 MIN: CPT | Mod: 25 | Performed by: FAMILY MEDICINE

## 2020-10-30 PROCEDURE — 90471 IMMUNIZATION ADMIN: CPT | Performed by: FAMILY MEDICINE

## 2020-10-30 PROCEDURE — U0003 INFECTIOUS AGENT DETECTION BY NUCLEIC ACID (DNA OR RNA); SEVERE ACUTE RESPIRATORY SYNDROME CORONAVIRUS 2 (SARS-COV-2) (CORONAVIRUS DISEASE [COVID-19]), AMPLIFIED PROBE TECHNIQUE, MAKING USE OF HIGH THROUGHPUT TECHNOLOGIES AS DESCRIBED BY CMS-2020-01-R: HCPCS | Performed by: FAMILY MEDICINE

## 2020-10-30 RX ORDER — LISINOPRIL 20 MG/1
20 TABLET ORAL DAILY
Qty: 90 TABLET | Refills: 3 | Status: SHIPPED | OUTPATIENT
Start: 2020-10-30 | End: 2021-03-04

## 2020-10-30 RX ORDER — DOXAZOSIN 8 MG/1
TABLET ORAL
Qty: 90 TABLET | Refills: 3 | Status: SHIPPED | OUTPATIENT
Start: 2020-10-30 | End: 2021-03-26 | Stop reason: DRUGHIGH

## 2020-10-30 RX ORDER — BUDESONIDE AND FORMOTEROL FUMARATE DIHYDRATE 160; 4.5 UG/1; UG/1
AEROSOL RESPIRATORY (INHALATION)
Qty: 10.2 G | Refills: 3 | Status: SHIPPED | OUTPATIENT
Start: 2020-10-30 | End: 2021-03-15

## 2020-10-30 RX ORDER — CETIRIZINE HYDROCHLORIDE 10 MG/1
10 TABLET ORAL EVERY MORNING
Qty: 90 TABLET | Refills: 3 | Status: SHIPPED | OUTPATIENT
Start: 2020-10-30

## 2020-10-30 RX ORDER — MONTELUKAST SODIUM 10 MG/1
TABLET ORAL
Qty: 90 TABLET | Refills: 3 | Status: SHIPPED | OUTPATIENT
Start: 2020-10-30 | End: 2021-05-03

## 2020-10-30 RX ORDER — BUPROPION HYDROCHLORIDE 150 MG/1
150 TABLET ORAL EVERY MORNING
Qty: 90 TABLET | Refills: 3 | Status: SHIPPED | OUTPATIENT
Start: 2020-10-30

## 2020-10-30 RX ORDER — ATORVASTATIN CALCIUM 40 MG/1
40 TABLET, FILM COATED ORAL DAILY
Qty: 90 TABLET | Refills: 3 | Status: SHIPPED | OUTPATIENT
Start: 2020-10-30

## 2020-10-30 ASSESSMENT — PATIENT HEALTH QUESTIONNAIRE - PHQ9
SUM OF ALL RESPONSES TO PHQ QUESTIONS 1-9: 12
5. POOR APPETITE OR OVEREATING: SEVERAL DAYS

## 2020-10-30 ASSESSMENT — ANXIETY QUESTIONNAIRES
IF YOU CHECKED OFF ANY PROBLEMS ON THIS QUESTIONNAIRE, HOW DIFFICULT HAVE THESE PROBLEMS MADE IT FOR YOU TO DO YOUR WORK, TAKE CARE OF THINGS AT HOME, OR GET ALONG WITH OTHER PEOPLE: SOMEWHAT DIFFICULT
1. FEELING NERVOUS, ANXIOUS, OR ON EDGE: MORE THAN HALF THE DAYS
7. FEELING AFRAID AS IF SOMETHING AWFUL MIGHT HAPPEN: SEVERAL DAYS
GAD7 TOTAL SCORE: 10
6. BECOMING EASILY ANNOYED OR IRRITABLE: MORE THAN HALF THE DAYS
3. WORRYING TOO MUCH ABOUT DIFFERENT THINGS: MORE THAN HALF THE DAYS
2. NOT BEING ABLE TO STOP OR CONTROL WORRYING: SEVERAL DAYS
5. BEING SO RESTLESS THAT IT IS HARD TO SIT STILL: SEVERAL DAYS

## 2020-10-30 ASSESSMENT — MIFFLIN-ST. JEOR: SCORE: 1665.82
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
## 2020-10-31 LAB
SARS-COV-2 RNA SPEC QL NAA+PROBE: NOT DETECTED
SPECIMEN SOURCE: NORMAL

## 2020-10-31 ASSESSMENT — ASTHMA QUESTIONNAIRES: ACT_TOTALSCORE: 9

## 2020-10-31 ASSESSMENT — ANXIETY QUESTIONNAIRES: GAD7 TOTAL SCORE: 10

## 2020-11-10 ENCOUNTER — TELEPHONE (OUTPATIENT)
Dept: FAMILY MEDICINE | Facility: CLINIC | Age: 60
End: 2020-11-10

## 2020-11-10 DIAGNOSIS — I10 ESSENTIAL HYPERTENSION WITH GOAL BLOOD PRESSURE LESS THAN 140/90: Primary | ICD-10-CM

## 2020-11-10 NOTE — TELEPHONE ENCOUNTER
Patient states has not been checking his blood pressure daily. Did check blood pressure this morning and blood pressure 200/106. Patient denies any symptoms  Patient taking Lisinopril 20mg daily  Patient has c/o increase fatigue with exertion and feels heart pounding at times  States does not drink any caffeine  Blood pressure now = 163/112, reports of no symptoms at this time  Patient states he will start taking blood pressure twice daily and recording    To provider to advise      Miranda LLOYDN, RN, CPN

## 2020-11-10 NOTE — TELEPHONE ENCOUNTER
Reason for call:  Results   Name of test or procedure: Labs  Date of test or procedure:10/30/20  Location of test or procedure: [facility]    Additional comments: na    Phone number to reach patient:  Home number on file [phone] (home)    Best Time:  any    Can we leave a detailed message on this number?  YES    Travel screening: Not Applicable

## 2020-11-10 NOTE — TELEPHONE ENCOUNTER
[facility],     Your covid test was negative.     Regards,     Patel Torrez M.D.         Left message on answering machine for patient to call back. [phone]  Miranda LLOYDN, RN, CPN

## 2020-11-11 RX ORDER — DILTIAZEM HYDROCHLORIDE 120 MG/1
120 CAPSULE, EXTENDED RELEASE ORAL DAILY
Qty: 30 CAPSULE | Refills: 0 | Status: SHIPPED | OUTPATIENT
Start: 2020-11-11 | End: 2020-11-16

## 2020-11-11 NOTE — TELEPHONE ENCOUNTER
Pt notified of provider message as written.  Pt verbalized good understanding.  Advised ok to call and leave information on my vm on Friday since I will be here.   Dunia LLOYDN, RN

## 2020-11-11 NOTE — TELEPHONE ENCOUNTER
Add Dilt 120 mg daily.    Send BP and heart rate on Friday.    If worrisome symptoms, go to [facility] Torrez M.D.

## 2020-11-12 DIAGNOSIS — M54.2 CHRONIC NECK PAIN: ICD-10-CM

## 2020-11-12 DIAGNOSIS — G89.29 CHRONIC NECK PAIN: ICD-10-CM

## 2020-11-12 DIAGNOSIS — I10 ESSENTIAL HYPERTENSION WITH GOAL BLOOD PRESSURE LESS THAN 140/90: ICD-10-CM

## 2020-11-12 RX ORDER — DILTIAZEM HYDROCHLORIDE 120 MG/1
120 CAPSULE, COATED, EXTENDED RELEASE ORAL DAILY
Qty: 30 CAPSULE | Refills: 0 | Status: SHIPPED | OUTPATIENT
Start: 2020-11-12 | End: 2020-12-23 | Stop reason: DRUGHIGH

## 2020-11-13 NOTE — TELEPHONE ENCOUNTER
Pt left  on triage phone stating he started the new medication yesterday afternoon and it did help bring down his bp.  Yesterday morning bp 188/98 a few hours after med 120/72  This morning bp 157/96 and 148/85 then after med 145/78  Dunia LLOYDN, RN

## 2020-11-16 DIAGNOSIS — I10 ESSENTIAL HYPERTENSION WITH GOAL BLOOD PRESSURE LESS THAN 140/90: ICD-10-CM

## 2020-11-16 NOTE — TELEPHONE ENCOUNTER
Pt notified of provider message as written.  Pt verbalized good understanding.  Appointment made.  Dunia LLOYDN, RN

## 2020-11-16 NOTE — TELEPHONE ENCOUNTER
Please call patient:     1. Increase the Diltiazem to 180 mg daily - new rx sent.     2. Set him up for Video (preferred) or telephone visit in about 3 weeks please.     3. Ask him to have about 10 days of daily blood pressure and heart rate at that visit.    Patel Torrez M.D.

## 2020-11-17 NOTE — TELEPHONE ENCOUNTER
This refill request was routed to me - and the [facility] pool.  I am a pediatrician at [facility] and should not have been part of the [facility] pool.  Please route to appropriate provider.   Thank you  Michaela Moore MD

## 2020-11-17 NOTE — TELEPHONE ENCOUNTER
We recently received an RX for Cardizem LA 180mg, Patient has previously been on Cardiazem CD 120mg. Did you mean to change the formulation as CD and LA are different? Please contact the phamacy.    Thank you,  Marlen Mcbride [facility] Float Technician    On Behalf Of  [facility]    If you have questions  Please contact the  Pharmacy directly.

## 2020-12-09 ENCOUNTER — TELEPHONE (OUTPATIENT)
Dept: FAMILY MEDICINE | Facility: CLINIC | Age: 60
End: 2020-12-09

## 2020-12-09 NOTE — TELEPHONE ENCOUNTER
Per Dr. Torrez's last visit with pt, he was supposed to do follow-up video visit with some recorded home BP readings  Please have him set up the appointment    Suly Patel MD

## 2020-12-09 NOTE — TELEPHONE ENCOUNTER
Patient's denist would not do any work on him because his blood pressure was high so he came and saw you and you prescribed new medication and now his blood pressure has been down and he would like to know if you will write a note clearing him for the dental work and fax it to  dentistry at [phone].    Thank you.

## 2020-12-10 NOTE — TELEPHONE ENCOUNTER
Called and informed patient of Dr Turner's message. He already has an appointment scheduled with Dr Torrez on 12/23/20. This is the next day that Dr Torrez is in the office.Nataly Bell MA/WENCESLAO
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
## 2020-12-23 ENCOUNTER — OFFICE VISIT (OUTPATIENT)
Dept: FAMILY MEDICINE | Facility: CLINIC | Age: 60
End: 2020-12-23
Payer: COMMERCIAL

## 2020-12-23 VITALS
SYSTOLIC BLOOD PRESSURE: 114 MMHG | TEMPERATURE: 98 F | DIASTOLIC BLOOD PRESSURE: 78 MMHG | HEIGHT: 69 IN | BODY MASS INDEX: 28.73 KG/M2 | OXYGEN SATURATION: 97 % | WEIGHT: 194 LBS | HEART RATE: 67 BPM

## 2020-12-23 DIAGNOSIS — F41.1 GENERALIZED ANXIETY DISORDER: Primary | ICD-10-CM

## 2020-12-23 DIAGNOSIS — I10 ESSENTIAL HYPERTENSION WITH GOAL BLOOD PRESSURE LESS THAN 140/90: ICD-10-CM

## 2020-12-23 PROCEDURE — 99214 OFFICE O/P EST MOD 30 MIN: CPT | Performed by: FAMILY MEDICINE

## 2020-12-23 RX ORDER — SERTRALINE HYDROCHLORIDE 25 MG/1
25 TABLET, FILM COATED ORAL DAILY
Qty: 90 TABLET | Refills: 3 | Status: SHIPPED | OUTPATIENT
Start: 2020-12-23 | End: 2021-03-04 | Stop reason: DRUGHIGH

## 2020-12-23 ASSESSMENT — MIFFLIN-ST. JEOR: SCORE: 1670.36

## 2020-12-23 NOTE — PATIENT INSTRUCTIONS
1. Cut down on alcohol to only one per day max.    2. Let's add Zoloft (Sertraline) 25 mg daily.    3. Continue your other medications.    4. Let me see you in 3-6 months for follow-up.
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
## 2020-12-23 NOTE — LETTER
December 23, 2020      Marin Mcneil  [address] [place] [address]    To:  [facility] at [phone]      RE: Marin Avendano's blood pressure has improved with medications. He may have his dental procedure as long as blood pressure is less 160/100.    Please contact me for questions or concerns.      Sincerely,        REFUGIO LAGUERRE MD

## 2021-01-03 ENCOUNTER — NURSE TRIAGE (OUTPATIENT)
Dept: NURSING | Facility: CLINIC | Age: 61
End: 2021-01-03

## 2021-01-03 NOTE — TELEPHONE ENCOUNTER
"Called to request for pain medication refill.  Caller requested for his primary care provider paged to him.  Caller was advised to call the clinic back tomorrow or have his symptom triage today-declined this.  Ophelia Jacinto RN      Reason for Disposition    Caller requesting a NON-URGENT new prescription or refill and triager unable to refill per unit policy     For pain medication    Additional Information    Negative: Drug overdose and nurse unable to answer question    Negative: Caller requesting information not related to medicine    Negative: Caller requesting a prescription for Strep throat and has a positive culture result    Negative: Rash while taking a medication or within 3 days of stopping it    Negative: Immunization reaction suspected    Negative: [1] Asthma AND [2] having symptoms of asthma (cough, wheezing, etc)    Negative: MORE THAN A DOUBLE DOSE of a prescription or over-the-counter (OTC) drug    Negative: [1] DOUBLE DOSE (an extra dose or lesser amount) of over-the-counter (OTC) drug AND [2] any symptoms (e.g., dizziness, nausea, pain, sleepiness)    Negative: [1] DOUBLE DOSE (an extra dose or lesser amount) of prescription drug AND [2] any symptoms (e.g., dizziness, nausea, pain, sleepiness)    Negative: Took another person's prescription drug    Negative: [1] DOUBLE DOSE (an extra dose or lesser amount) of prescription drug AND [2] NO symptoms (Exception: a double dose of antibiotics)    Negative: Diabetes drug error or overdose (e.g., insulin or extra dose)    Negative: [1] Request for URGENT new prescription or refill of \"essential\" medication (i.e., likelihood of harm to patient if not taken) AND [2] triager unable to fill per unit policy    Negative: [1] Prescription not at pharmacy AND [2] was prescribed today by PCP    Negative: Pharmacy calling with prescription questions and triager unable to answer question    Negative: Caller has urgent medication question about med that PCP " prescribed and triager unable to answer question    Negative: Caller has NON-URGENT medication question about med that PCP prescribed and triager unable to answer question    Protocols used: MEDICATION QUESTION CALL-A-AH

## 2021-01-12 NOTE — TELEPHONE ENCOUNTER
Central Prior Authorization Team   Phone: [phone]      PA Initiation    Medication: Omeprazole 20mg  Insurance Company: [employer] [place] - Phone [phone] Fax [phone]  Pharmacy Filling the Rx: [place], [place] - [address]   Filling Pharmacy Phone: [phone]  Filling Pharmacy Fax:    Start Date: 11/15/2019       ambulatory

## 2021-01-14 ENCOUNTER — TELEPHONE (OUTPATIENT)
Dept: FAMILY MEDICINE | Facility: CLINIC | Age: 61
End: 2021-01-14

## 2021-01-14 DIAGNOSIS — K08.89 PAIN, DENTAL: Primary | ICD-10-CM

## 2021-01-14 RX ORDER — HYDROCODONE BITARTRATE AND ACETAMINOPHEN 5; 325 MG/1; MG/1
1 TABLET ORAL EVERY 6 HOURS PRN
Qty: 4 TABLET | Refills: 0 | Status: SHIPPED | OUTPATIENT
Start: 2021-01-14 | End: 2021-01-17

## 2021-01-14 NOTE — TELEPHONE ENCOUNTER
Patient is calling, he had some dental work done and was given 12 pain pills. The dentis only works M, T, W. He would like Dr Torrez to prescribe some pain pills for him, he said he only needs a few. I asked what the name of the prescription was, he said it doesn't matter, Dr Torrez will know what to give hi,.Nataly Bell MA/WENCESLAO

## 2021-01-14 NOTE — TELEPHONE ENCOUNTER
Left message on pt vm that prescription he requested was sent to pharmacy by Dr. Patel Torrez.  Dunia Rahman BSN, RN

## 2021-01-15 ENCOUNTER — HEALTH MAINTENANCE LETTER (OUTPATIENT)
Age: 61
End: 2021-01-15

## 2021-01-16 ENCOUNTER — TELEPHONE (OUTPATIENT)
Dept: FAMILY MEDICINE | Facility: CLINIC | Age: 61
End: 2021-01-16

## 2021-02-02 DIAGNOSIS — M54.2 CHRONIC NECK PAIN: ICD-10-CM

## 2021-02-02 DIAGNOSIS — G89.29 CHRONIC NECK PAIN: ICD-10-CM

## 2021-03-04 ENCOUNTER — OFFICE VISIT (OUTPATIENT)
Dept: FAMILY MEDICINE | Facility: CLINIC | Age: 61
End: 2021-03-04
Payer: COMMERCIAL

## 2021-03-04 VITALS
HEIGHT: 69 IN | BODY MASS INDEX: 28.14 KG/M2 | WEIGHT: 190 LBS | HEART RATE: 68 BPM | OXYGEN SATURATION: 95 % | TEMPERATURE: 97.1 F | SYSTOLIC BLOOD PRESSURE: 130 MMHG | DIASTOLIC BLOOD PRESSURE: 80 MMHG

## 2021-03-04 DIAGNOSIS — F41.1 GENERALIZED ANXIETY DISORDER: ICD-10-CM

## 2021-03-04 DIAGNOSIS — R06.02 EXERTIONAL SHORTNESS OF BREATH: ICD-10-CM

## 2021-03-04 DIAGNOSIS — I10 ESSENTIAL HYPERTENSION WITH GOAL BLOOD PRESSURE LESS THAN 140/90: ICD-10-CM

## 2021-03-04 DIAGNOSIS — J45.40 MODERATE PERSISTENT ASTHMA WITHOUT COMPLICATION: ICD-10-CM

## 2021-03-04 DIAGNOSIS — G47.30 SLEEP APNEA, UNSPECIFIED TYPE: Primary | ICD-10-CM

## 2021-03-04 PROCEDURE — 99214 OFFICE O/P EST MOD 30 MIN: CPT | Performed by: FAMILY MEDICINE

## 2021-03-04 RX ORDER — DILTIAZEM HYDROCHLORIDE 120 MG/1
120 CAPSULE, EXTENDED RELEASE ORAL DAILY
Qty: 90 CAPSULE | Refills: 3 | Status: SHIPPED | OUTPATIENT
Start: 2021-03-04

## 2021-03-04 RX ORDER — LISINOPRIL 10 MG/1
10 TABLET ORAL 2 TIMES DAILY
Qty: 180 TABLET | Refills: 3 | Status: SHIPPED | OUTPATIENT
Start: 2021-03-04

## 2021-03-04 ASSESSMENT — MIFFLIN-ST. JEOR: SCORE: 1652.21

## 2021-03-04 NOTE — PATIENT INSTRUCTIONS
1. Set up the sleep specialist appointment - see below for phone number.    2. Let's stop the Cardura.    3. Split the Lisinopril to 1/2 twice per day. When you run out the new rx will be 10 mg twice per day. Remember that Lisinopril can cause a dry cough - let me know if that happens.    4. Set up the asthma specialist appointment - stop by the  today. Otherwise call [phone].    5. Set up the stress test - [phone].    6. Increase Sertraline 25 mg 2 per day - the new rx will be 50 mg one per day.    7. Please please please cut down and eventually eliminate alcohol. Let me know if you want help.    8. See you by video or telephone in 2 weeks to follow up on all the above - have daily blood pressure and heart rate ready for me.
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
## 2021-03-05 ENCOUNTER — TELEPHONE (OUTPATIENT)
Dept: FAMILY MEDICINE | Facility: CLINIC | Age: 61
End: 2021-03-05

## 2021-03-05 ASSESSMENT — ASTHMA QUESTIONNAIRES: ACT_TOTALSCORE: 13

## 2021-03-05 NOTE — TELEPHONE ENCOUNTER
Patient would like the referral for sleep study to be faxed to [place] [facility] with Dr. Mansfield at fax # [phone].  Please call patient when the referral is faxed. What they will need is the referral and the reason for the referral.  Marvin Jose,  For [facility]

## 2021-03-05 NOTE — TELEPHONE ENCOUNTER
Faxed referral to Dr Mansfield @ [phone].Called and informed patient that this was done. Nataly Bell MA/WENCESLAO

## 2021-03-12 ENCOUNTER — TELEPHONE (OUTPATIENT)
Dept: ALLERGY | Facility: CLINIC | Age: 61
End: 2021-03-12

## 2021-03-12 ENCOUNTER — ANCILLARY PROCEDURE (OUTPATIENT)
Dept: GENERAL RADIOLOGY | Facility: CLINIC | Age: 61
End: 2021-03-12
Attending: ALLERGY & IMMUNOLOGY
Payer: COMMERCIAL

## 2021-03-12 ENCOUNTER — OFFICE VISIT (OUTPATIENT)
Dept: ALLERGY | Facility: CLINIC | Age: 61
End: 2021-03-12
Payer: COMMERCIAL

## 2021-03-12 VITALS
HEART RATE: 69 BPM | TEMPERATURE: 98.6 F | DIASTOLIC BLOOD PRESSURE: 85 MMHG | SYSTOLIC BLOOD PRESSURE: 135 MMHG | BODY MASS INDEX: 28.88 KG/M2 | WEIGHT: 195.55 LBS | OXYGEN SATURATION: 97 %

## 2021-03-12 DIAGNOSIS — J31.0 CHRONIC RHINITIS: ICD-10-CM

## 2021-03-12 DIAGNOSIS — H10.89 OTHER CONJUNCTIVITIS OF BOTH EYES: ICD-10-CM

## 2021-03-12 DIAGNOSIS — K21.9 GASTROESOPHAGEAL REFLUX DISEASE, UNSPECIFIED WHETHER ESOPHAGITIS PRESENT: ICD-10-CM

## 2021-03-12 DIAGNOSIS — J45.40 NOT WELL CONTROLLED MODERATE PERSISTENT ASTHMA: Primary | ICD-10-CM

## 2021-03-12 DIAGNOSIS — J45.40 NOT WELL CONTROLLED MODERATE PERSISTENT ASTHMA: ICD-10-CM

## 2021-03-12 LAB
BASOPHILS # BLD AUTO: 0 10E9/L (ref 0–0.2)
BASOPHILS NFR BLD AUTO: 0.1 %
DIFFERENTIAL METHOD BLD: ABNORMAL
EOSINOPHIL # BLD AUTO: 0.1 10E9/L (ref 0–0.7)
EOSINOPHIL NFR BLD AUTO: 1.4 %
ERYTHROCYTE [DISTWIDTH] IN BLOOD BY AUTOMATED COUNT: 12.8 % (ref 10–15)
HCT VFR BLD AUTO: 42.4 % (ref 40–53)
HGB BLD-MCNC: 14.2 G/DL (ref 13.3–17.7)
LYMPHOCYTES # BLD AUTO: 1.5 10E9/L (ref 0.8–5.3)
LYMPHOCYTES NFR BLD AUTO: 15.7 %
MCH RBC QN AUTO: 32.3 PG (ref 26.5–33)
MCHC RBC AUTO-ENTMCNC: 33.5 G/DL (ref 31.5–36.5)
MCV RBC AUTO: 97 FL (ref 78–100)
MONOCYTES # BLD AUTO: 1 10E9/L (ref 0–1.3)
MONOCYTES NFR BLD AUTO: 10.2 %
NEUTROPHILS # BLD AUTO: 7 10E9/L (ref 1.6–8.3)
NEUTROPHILS NFR BLD AUTO: 72.6 %
PLATELET # BLD AUTO: 272 10E9/L (ref 150–450)
RBC # BLD AUTO: 4.39 10E12/L (ref 4.4–5.9)
WBC # BLD AUTO: 9.7 10E9/L (ref 4–11)

## 2021-03-12 PROCEDURE — 36415 COLL VENOUS BLD VENIPUNCTURE: CPT | Performed by: ALLERGY & IMMUNOLOGY

## 2021-03-12 PROCEDURE — 86003 ALLG SPEC IGE CRUDE XTRC EA: CPT | Performed by: ALLERGY & IMMUNOLOGY

## 2021-03-12 PROCEDURE — 82785 ASSAY OF IGE: CPT | Performed by: ALLERGY & IMMUNOLOGY

## 2021-03-12 PROCEDURE — 71046 X-RAY EXAM CHEST 2 VIEWS: CPT | Performed by: RADIOLOGY

## 2021-03-12 PROCEDURE — 85025 COMPLETE CBC W/AUTO DIFF WBC: CPT | Performed by: ALLERGY & IMMUNOLOGY

## 2021-03-12 PROCEDURE — 99204 OFFICE O/P NEW MOD 45 MIN: CPT | Performed by: ALLERGY & IMMUNOLOGY

## 2021-03-12 RX ORDER — AZELASTINE HYDROCHLORIDE 0.5 MG/ML
1 SOLUTION/ DROPS OPHTHALMIC 2 TIMES DAILY
Qty: 6 ML | Refills: 3 | Status: SHIPPED | OUTPATIENT
Start: 2021-03-12 | End: 2021-04-04

## 2021-03-12 RX ORDER — FLUTICASONE PROPIONATE 50 MCG
2 SPRAY, SUSPENSION (ML) NASAL DAILY
Qty: 16 G | Refills: 3 | Status: SHIPPED | OUTPATIENT
Start: 2021-03-12

## 2021-03-12 RX ORDER — AZELASTINE 1 MG/ML
2 SPRAY, METERED NASAL 2 TIMES DAILY PRN
Qty: 30 ML | Refills: 3 | Status: SHIPPED | OUTPATIENT
Start: 2021-03-12

## 2021-03-12 RX ORDER — ALBUTEROL SULFATE 90 UG/1
2-4 AEROSOL, METERED RESPIRATORY (INHALATION) EVERY 4 HOURS PRN
Qty: 3 INHALER | Refills: 1 | Status: SHIPPED | OUTPATIENT
Start: 2021-03-12

## 2021-03-12 SDOH — ECONOMIC STABILITY: INCOME INSECURITY: HOW HARD IS IT FOR YOU TO PAY FOR THE VERY BASICS LIKE FOOD, HOUSING, MEDICAL CARE, AND HEATING?: NOT ASKED

## 2021-03-12 SDOH — ECONOMIC STABILITY: FOOD INSECURITY: WITHIN THE PAST 12 MONTHS, YOU WORRIED THAT YOUR FOOD WOULD RUN OUT BEFORE YOU GOT MONEY TO BUY MORE.: NOT ASKED

## 2021-03-12 SDOH — ECONOMIC STABILITY: TRANSPORTATION INSECURITY
IN THE PAST 12 MONTHS, HAS LACK OF TRANSPORTATION KEPT YOU FROM MEETINGS, WORK, OR FROM GETTING THINGS NEEDED FOR DAILY LIVING?: NOT ASKED

## 2021-03-12 SDOH — ECONOMIC STABILITY: TRANSPORTATION INSECURITY
IN THE PAST 12 MONTHS, HAS THE LACK OF TRANSPORTATION KEPT YOU FROM MEDICAL APPOINTMENTS OR FROM GETTING MEDICATIONS?: NOT ASKED

## 2021-03-12 SDOH — ECONOMIC STABILITY: FOOD INSECURITY: WITHIN THE PAST 12 MONTHS, THE FOOD YOU BOUGHT JUST DIDN'T LAST AND YOU DIDN'T HAVE MONEY TO GET MORE.: NOT ASKED

## 2021-03-12 ASSESSMENT — ENCOUNTER SYMPTOMS
SLEEP DISTURBANCE: 1
NERVOUS/ANXIOUS: 1
RHINORRHEA: 1
SHORTNESS OF BREATH: 1
WHEEZING: 1
FATIGUE: 0
JOINT SWELLING: 0
MYALGIAS: 0
CHEST TIGHTNESS: 1
FEVER: 0
ADENOPATHY: 0
VOMITING: 0
DIZZINESS: 1
COUGH: 1
SINUS PRESSURE: 0
DIARRHEA: 0
ARTHRALGIAS: 0
ACTIVITY CHANGE: 0
EYE DISCHARGE: 1
HEADACHES: 0
EYE ITCHING: 0
EYE REDNESS: 0
FACIAL SWELLING: 0
NAUSEA: 0

## 2021-03-12 NOTE — NURSING NOTE
Writer demonstrated how to use an MDI inhaler with a spacer for patient.  Patient instructed to shake the inhaler for 5 seconds, empty the lungs by exhaling away from inhaler, put the inhaler + spacer in his mouth, push down on the inhaler and breathe in at the same time and then hold breath for 10 seconds.  RN informed patient that if an audible whistle is heard from spacer, he needs to inhale more slowly.  Patient advised to wait 1-2 minutes between puffs.  Patient instructed on how to clean the MDI inhaler, take the medication canister out, wash it in warm soapy water, rinse it and then let it dry over night.  RN advised patient to refer to handout with spacer for cleaning instructions.  Patient informed the inhaler needs to be washed once a week or when he notices a powder buildup.  Patient verbalized understanding.     Clarisse FLORES RN  Specialty/Allergy Clinics

## 2021-03-12 NOTE — PATIENT INSTRUCTIONS
Stop Symbicort.   Start Trelegy Ellipta 200/62.5/25 1 puff once daily.     -Continue using albuterol inhaler 2-4 puffs every 4 hours as needed for chest tightness/wheezing/shortness of breath/persistent cough.  -Use albuterol inhaler with a chamber device.    -start Flonase 2 sprays/each nostril once a day.  -Use azelastine 2 sprays in each nostril twice a day when necessary.  Optivar 1 drop in each eye twice daily as needed.    Get the bloodwork done.    Get Pulmonary function test within 5 days before the next appointment.   Call to schedule:    [phone]
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
## 2021-03-12 NOTE — TELEPHONE ENCOUNTER
Prior Authorization Retail Medication Request    Medication/Dose: Trelegy ellipta 200-62.5 aepb  ICD code (if different than what is on RX):    Previously Tried and Failed:    Rationale:  NON-PREFERRED. TRAVON BOYER [phone]    Insurance Name:  BRISA AYALA [facility]  Insurance ID:  [id number]      Nba Singh Technician   [facility]  On behalf of  [facility]  [phone] [place] [address]  Phone : [phone]  Fax : [phone]

## 2021-03-13 ASSESSMENT — ASTHMA QUESTIONNAIRES: ACT_TOTALSCORE: 11

## 2021-03-15 NOTE — RESULT ENCOUNTER NOTE
[employer] message sent:    chest x-ray:  No acute cardiopulmonary changes.  -No changes in the plan.    Dudley Quezada MD

## 2021-03-16 LAB
C HERBARUM IGE QN: <0.1 KU(A)/L
CALIF WALNUT POLN IGE QN: 0.25 KU(A)/L
CEDAR IGE QN: <0.1 KU(A)/L
COTTONWOOD IGE QN: 1 KU(A)/L
D PTERONYSS IGE QN: 10.1 KU(A)/L
DOG DANDER+EPITH IGE QN: <0.1 KU(A)/L
MAPLE IGE QN: 0.66 KU(A)/L
MUGWORT IGE QN: 0.63 KU(A)/L
SALTWORT IGE QN: 0.21 KU(A)/L
SHEEP SORREL IGE QN: 0.19 KU(A)/L
SILVER BIRCH IGE QN: 1.41 KU(A)/L
WHITE ELM IGE QN: 0.27 KU(A)/L

## 2021-03-17 LAB
A ALTERNATA IGE QN: <0.1 KU(A)/L
CAT DANDER IGG QN: 0.22 KU(A)/L
E PURPURASCENS IGE QN: <0.1 KU(A)/L
EAST WHITE PINE IGE QN: 0.15 KU(A)/L
ENGL PLANTAIN IGE QN: 0.15 KU(A)/L
FIREBUSH IGE QN: <0.1 KU(A)/L
GOOSEFOOT IGE QN: 0.35 KU(A)/L
JOHNSON GRASS IGE QN: 0.23 KU(A)/L
P NOTATUM IGE QN: <0.1 KU(A)/L
TIMOTHY IGE QN: 0.69 KU(A)/L
WHITE MULBERRY IGE QN: <0.1 KU(A)/L
WORMWOOD IGE QN: 0.93 KU(A)/L

## 2021-03-18 LAB
A FUMIGATUS IGE QN: <0.1 KU(A)/L
COCKSFOOT IGE QN: 0.89 KU(A)/L
COMMON RAGWEED IGE QN: 10.4 KU(A)/L
D FARINAE IGE QN: 10.6 KU(A)/L
GIANT RAGWEED IGE QN: 4.94 KU(A)/L
IGE SERPL-ACNC: 66 KIU/L (ref 0–114)
NETTLE IGE QN: <0.1 KU(A)/L
RED MULBERRY IGE QN: <0.1 KU(A)/L
WHITE ASH IGE QN: 0.29 KU(A)/L
WHITE OAK IGE QN: 0.93 KU(A)/L

## 2021-03-19 ENCOUNTER — VIRTUAL VISIT (OUTPATIENT)
Dept: FAMILY MEDICINE | Facility: CLINIC | Age: 61
End: 2021-03-19
Payer: MEDICAID

## 2021-03-19 DIAGNOSIS — I10 ESSENTIAL HYPERTENSION WITH GOAL BLOOD PRESSURE LESS THAN 140/90: Primary | ICD-10-CM

## 2021-03-19 PROCEDURE — 99213 OFFICE O/P EST LOW 20 MIN: CPT | Mod: GT | Performed by: FAMILY MEDICINE
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
## 2021-03-19 NOTE — RESULT ENCOUNTER NOTE
[employer] message sent:    Total serum IgE is within normal limits.  Sensitivity to dust mites, tree pollen, and weed pollen noted.  Mild sensitivity to grass pollen and cat dander noted as well.  -Recommend avoidance measures.  -No changes in the current treatment plan.  Depending on the future symptom control, we can discuss either allergen immunotherapy or Biologics for asthma.

## 2021-03-22 ENCOUNTER — HOSPITAL ENCOUNTER (OUTPATIENT)
Dept: CARDIOLOGY | Facility: CLINIC | Age: 61
End: 2021-03-22
Attending: FAMILY MEDICINE
Payer: COMMERCIAL

## 2021-03-22 ENCOUNTER — HOSPITAL ENCOUNTER (OUTPATIENT)
Dept: NUCLEAR MEDICINE | Facility: CLINIC | Age: 61
Setting detail: NUCLEAR MEDICINE
End: 2021-03-22
Attending: FAMILY MEDICINE
Payer: COMMERCIAL

## 2021-03-22 VITALS — WEIGHT: 195 LBS | BODY MASS INDEX: 28.88 KG/M2 | HEIGHT: 69 IN

## 2021-03-22 DIAGNOSIS — R06.02 EXERTIONAL SHORTNESS OF BREATH: ICD-10-CM

## 2021-03-22 LAB
CV BLOOD PRESSURE: 68 %
CV STRESS MAX HR HE: 127
NUC STRESS EJECTION FRACTION: 67 %
RATE PRESSURE PRODUCT: NORMAL
STRESS ECHO BASELINE DIASTOLIC HE: 80
STRESS ECHO BASELINE HR: 65
STRESS ECHO BASELINE SYSTOLIC BP: 120
STRESS ECHO CALCULATED PERCENT HR: 80 %
STRESS ECHO LAST STRESS DIASTOLIC BP: 90
STRESS ECHO LAST STRESS SYSTOLIC BP: 160
STRESS ECHO TARGET HR: 158
STRESS/REST PERFUSION RATIO: 1.06

## 2021-03-22 PROCEDURE — 250N000011 HC RX IP 250 OP 636: Performed by: INTERNAL MEDICINE

## 2021-03-22 PROCEDURE — 78452 HT MUSCLE IMAGE SPECT MULT: CPT

## 2021-03-22 PROCEDURE — 93017 CV STRESS TEST TRACING ONLY: CPT

## 2021-03-22 PROCEDURE — 250N000011 HC RX IP 250 OP 636: Performed by: FAMILY MEDICINE

## 2021-03-22 PROCEDURE — 343N000001 HC RX 343: Performed by: FAMILY MEDICINE

## 2021-03-22 PROCEDURE — 93016 CV STRESS TEST SUPVJ ONLY: CPT | Performed by: INTERNAL MEDICINE

## 2021-03-22 PROCEDURE — A9502 TC99M TETROFOSMIN: HCPCS | Performed by: FAMILY MEDICINE

## 2021-03-22 PROCEDURE — 78452 HT MUSCLE IMAGE SPECT MULT: CPT | Mod: 26 | Performed by: INTERNAL MEDICINE

## 2021-03-22 PROCEDURE — 93018 CV STRESS TEST I&R ONLY: CPT | Performed by: INTERNAL MEDICINE

## 2021-03-22 RX ORDER — AMINOPHYLLINE 25 MG/ML
25 INJECTION, SOLUTION INTRAVENOUS ONCE
Status: COMPLETED | OUTPATIENT
Start: 2021-03-22 | End: 2021-03-22

## 2021-03-22 RX ORDER — REGADENOSON 0.08 MG/ML
0.4 INJECTION, SOLUTION INTRAVENOUS ONCE
Status: COMPLETED | OUTPATIENT
Start: 2021-03-22 | End: 2021-03-22

## 2021-03-22 RX ADMIN — TETROFOSMIN 31.1 MCI.: 1.38 INJECTION, POWDER, LYOPHILIZED, FOR SOLUTION INTRAVENOUS at 15:16

## 2021-03-22 RX ADMIN — AMINOPHYLLINE 25 MG: 25 INJECTION, SOLUTION INTRAVENOUS at 14:26

## 2021-03-22 RX ADMIN — REGADENOSON 0.4 MG: 0.08 INJECTION, SOLUTION INTRAVENOUS at 14:16

## 2021-03-22 RX ADMIN — TETROFOSMIN 10.5 MCI.: 1.38 INJECTION, POWDER, LYOPHILIZED, FOR SOLUTION INTRAVENOUS at 12:40

## 2021-03-22 ASSESSMENT — MIFFLIN-ST. JEOR: SCORE: 1674.89

## 2021-03-22 NOTE — TELEPHONE ENCOUNTER
Prior Authorization Approval    Authorization Effective Date: 1/1/2021  Authorization Expiration Date: 3/22/2022  Medication: Trelegy ellipta 200-62.5 aepb-APPROVED  Approved Dose/Quantity:   Reference #:     Insurance Company: [employer]AP - Phone [phone] Fax [phone]  Expected CoPay:       CoPay Card Available:      Foundation Assistance Needed:    Which Pharmacy is filling the prescription (Not needed for infusion/clinic administered): [facility] [place], [place] - [address] [place] [address]  Pharmacy Notified: Yes  Patient Notified: No    Pharmacy will notify patient when medication is ready.

## 2021-03-22 NOTE — TELEPHONE ENCOUNTER
Central Prior Authorization Team   Phone: [phone]    PA Initiation    Medication: Trelegy ellipta 200-62.5 aepb-Initiated  Insurance Company: [facility] - Phone [phone] Fax [phone]  Pharmacy Filling the Rx: [facility] [place], [place] - [address] [facility]  Filling Pharmacy Phone: [phone]  Filling Pharmacy Fax:    Start Date: 3/22/2021

## 2021-03-26 ENCOUNTER — MYC MEDICAL ADVICE (OUTPATIENT)
Dept: FAMILY MEDICINE | Facility: CLINIC | Age: 61
End: 2021-03-26

## 2021-03-26 DIAGNOSIS — N40.1 BENIGN PROSTATIC HYPERPLASIA WITH LOWER URINARY TRACT SYMPTOMS, SYMPTOM DETAILS UNSPECIFIED: ICD-10-CM

## 2021-03-26 RX ORDER — DOXAZOSIN 4 MG/1
4 TABLET ORAL AT BEDTIME
Qty: 90 TABLET | Refills: 3 | Status: SHIPPED | OUTPATIENT
Start: 2021-03-26

## 2021-03-26 NOTE — TELEPHONE ENCOUNTER
Provider:  Are you willing to restart the patient's doxazosin (CARDURA) 8 MG tablet   90 tablet Sig: TAKE ONE TABLET BY MOUTH AT BEDTIME?  Please see MyChart message from patient.  Thank you. Nakia Estrada R.N.

## 2021-03-26 NOTE — TELEPHONE ENCOUNTER
See pt message below. Awaiting pt response.    See 3/19/21 Virtual Visit plan:   Cardura (chosen for BPH) - we previously stopped due to dizziness.     Charlene Calvo, GABINON, RN

## 2021-04-01 ENCOUNTER — HOSPITAL ENCOUNTER (OUTPATIENT)
Dept: RESPIRATORY THERAPY | Facility: CLINIC | Age: 61
End: 2021-04-01
Attending: INTERNAL MEDICINE
Payer: MEDICAID

## 2021-04-01 DIAGNOSIS — J45.40 NOT WELL CONTROLLED MODERATE PERSISTENT ASTHMA: ICD-10-CM

## 2021-04-01 PROCEDURE — 94060 EVALUATION OF WHEEZING: CPT | Mod: 26 | Performed by: INTERNAL MEDICINE

## 2021-04-01 PROCEDURE — 94729 DIFFUSING CAPACITY: CPT | Mod: 26 | Performed by: INTERNAL MEDICINE

## 2021-04-01 PROCEDURE — 999N000105 HC STATISTIC NO DOCUMENTATION TO SUPPORT CHARGE

## 2021-04-01 PROCEDURE — 94640 AIRWAY INHALATION TREATMENT: CPT

## 2021-04-01 PROCEDURE — 94726 PLETHYSMOGRAPHY LUNG VOLUMES: CPT | Mod: 26 | Performed by: INTERNAL MEDICINE

## 2021-04-01 PROCEDURE — 94060 EVALUATION OF WHEEZING: CPT

## 2021-04-01 PROCEDURE — 94726 PLETHYSMOGRAPHY LUNG VOLUMES: CPT

## 2021-04-01 PROCEDURE — 250N000009 HC RX 250: Performed by: ALLERGY & IMMUNOLOGY

## 2021-04-01 PROCEDURE — 94729 DIFFUSING CAPACITY: CPT

## 2021-04-01 RX ORDER — ALBUTEROL SULFATE 0.83 MG/ML
2.5 SOLUTION RESPIRATORY (INHALATION) ONCE
Status: COMPLETED | OUTPATIENT
Start: 2021-04-01 | End: 2021-04-01

## 2021-04-01 RX ADMIN — ALBUTEROL SULFATE 2.5 MG: 2.5 SOLUTION RESPIRATORY (INHALATION) at 09:50

## 2021-04-02 LAB
DLCOCOR-%PRED-PRE: 119 %
DLCOCOR-PRE: 31.73 ML/MIN/MMHG
DLCOUNC-%PRED-PRE: 118 %
DLCOUNC-PRE: 31.36 ML/MIN/MMHG
DLCOUNC-PRED: 26.45 ML/MIN/MMHG
ERV-%PRED-PRE: 19 %
ERV-PRE: 0.2 L
ERV-PRED: 1.03 L
EXPTIME-PRE: 8.73 SEC
FEF2575-%PRED-POST: 48 %
FEF2575-%PRED-PRE: 29 %
FEF2575-POST: 1.37 L/SEC
FEF2575-PRE: 0.84 L/SEC
FEF2575-PRED: 2.81 L/SEC
FEFMAX-%PRED-PRE: 67 %
FEFMAX-PRE: 5.96 L/SEC
FEFMAX-PRED: 8.85 L/SEC
FEV1-%PRED-PRE: 58 %
FEV1-PRE: 1.98 L
FEV1FEV6-PRE: 60 %
FEV1FEV6-PRED: 79 %
FEV1FVC-PRE: 56 %
FEV1FVC-PRED: 77 %
FEV1SVC-PRE: 46 %
FEV1SVC-PRED: 71 %
FIFMAX-PRE: 5.94 L/SEC
FRCPLETH-%PRED-PRE: 124 %
FRCPLETH-PRE: 4.44 L
FRCPLETH-PRED: 3.57 L
FVC-%PRED-PRE: 79 %
FVC-PRE: 3.53 L
FVC-PRED: 4.41 L
IC-%PRED-PRE: 108 %
IC-PRE: 4.05 L
IC-PRED: 3.74 L
RVPLETH-%PRED-PRE: 174 %
RVPLETH-PRE: 4.24 L
RVPLETH-PRED: 2.43 L
TLCPLETH-%PRED-PRE: 122 %
TLCPLETH-PRE: 8.49 L
TLCPLETH-PRED: 6.92 L
VA-%PRED-PRE: 109 %
VA-PRE: 6.91 L
VC-%PRED-PRE: 89 %
VC-PRE: 4.25 L
VC-PRED: 4.77 L

## 2021-04-05 NOTE — RESULT ENCOUNTER NOTE
Moderate obstruction with reversibility after albuterol.  -No changes in the plan.  I recommend to use trilogy Ellipta 200/62.5/25 mcg 1 puff once daily and montelukast 10 mg by mouth once daily as controller medications.  Follow-up at the end of April.

## 2021-04-06 ENCOUNTER — TELEPHONE (OUTPATIENT)
Dept: ALLERGY | Facility: CLINIC | Age: 61
End: 2021-04-06

## 2021-04-29 ENCOUNTER — TELEPHONE (OUTPATIENT)
Dept: ALLERGY | Facility: CLINIC | Age: 61
End: 2021-04-29

## 2021-04-29 DIAGNOSIS — F41.1 GENERALIZED ANXIETY DISORDER: ICD-10-CM

## 2021-04-29 NOTE — TELEPHONE ENCOUNTER
Central Prior Authorization Team   Phone: [phone]      PA Initiation    Medication: Trelegy  Insurance Company: Other (see comments)Comment:  [facility] [phone]  Pharmacy Filling the Rx: [facility] [address] [employer]  Filling Pharmacy Phone: [phone]  Filling Pharmacy Fax:    Start Date: 4/29/2021    Filled out forms but they have to be faxed to insurance by the pharmacy. Faxed forms to  pharmacy [phone].

## 2021-04-29 NOTE — TELEPHONE ENCOUNTER
Fax received from [facility]. Requesting for refill of sertraline from Dr Quezada as he the prescribing provider is not enrolled in his insurance.     Dr Quezada does not prescribe this medication. Pt will need to discuss with his PCP.    Clarisse FLORES RN  Specialty/Allergy Clinics

## 2021-04-29 NOTE — TELEPHONE ENCOUNTER
Fax received from [facility]. Requesting prior authorization for Trelegy 200-62.5-25g.    [email or web address]  Key [id number]    Clarisse FLORES RN  Specialty/Allergy Clinics

## 2021-04-30 NOTE — TELEPHONE ENCOUNTER
Prior Authorization Approval    Authorization Effective Date:    Authorization Expiration Date:    Medication: Trelegy-APPROVED  Approved Dose/Quantity:   Reference #:     Insurance Company: Other (see comments)Comment:  [facility] [employer] [phone]  Expected CoPay:       CoPay Card Available:      Foundation Assistance Needed:    Which Pharmacy is filling the prescription (Not needed for infusion/clinic administered): [facility] [address]  Pharmacy Notified: Yes  Patient Notified: No    Called pharmacy to see if they have heard anything on the PA. Per pat Friedman, they received a call yesterday saying it was approved.  They have not received any paperwork yet and Elder didn't see a date range listed for the approval.  The medication is filled and ready for .

## 2021-05-03 DIAGNOSIS — K21.9 GASTROESOPHAGEAL REFLUX DISEASE, UNSPECIFIED WHETHER ESOPHAGITIS PRESENT: ICD-10-CM

## 2021-05-03 DIAGNOSIS — Z91.09 ENVIRONMENTAL ALLERGIES: ICD-10-CM

## 2021-05-03 RX ORDER — MONTELUKAST SODIUM 10 MG/1
TABLET ORAL
Qty: 90 TABLET | Refills: 1 | Status: SHIPPED | OUTPATIENT
Start: 2021-05-03

## 2021-07-02 ENCOUNTER — TELEPHONE (OUTPATIENT)
Dept: FAMILY MEDICINE | Facility: CLINIC | Age: 61
End: 2021-07-02

## 2021-07-02 NOTE — TELEPHONE ENCOUNTER
Fax message: Need for Clarification. Drug: Tizanidine 4mg tab. Provider, please enroll in [place] Medicaid or have another provider prescriber resend.

## 2021-07-02 NOTE — TELEPHONE ENCOUNTER
Left message on answering machine for patient to call back to [phone].    Does he have new insurance?  Is he establishing with a new provider?  Dunia LLOYDN, RN

## 2021-07-05 NOTE — TELEPHONE ENCOUNTER
"To provider to advise on original message from [facility], [place], [place].    \"Fax message: Need for Clarification. Drug: Tizanidine 4mg tab. Provider, please enroll in [place] Medicaid or have another provider prescriber resend.\"  "

## 2021-07-15 RX ORDER — DILTIAZEM HYDROCHLORIDE 120 MG/1
120 CAPSULE, COATED, EXTENDED RELEASE ORAL DAILY
Qty: 30 CAPSULE | Refills: 0 | Status: SHIPPED | OUTPATIENT
Start: 2021-07-15

## 2021-07-19 ENCOUNTER — TELEPHONE (OUTPATIENT)
Dept: FAMILY MEDICINE | Facility: CLINIC | Age: 61
End: 2021-07-19

## 2021-07-19 NOTE — TELEPHONE ENCOUNTER
Left message on answering machine for patient/parent to call back.   [phone]  Sarah Patel RN

## 2021-07-19 NOTE — TELEPHONE ENCOUNTER
Reason for call:  Other   Patient called regarding (reason for call): call back  Additional comments: Patient is calling regarding the transferring of medical records and getting his prescription refilled for his blood pressure medication. Informed patient of out release of information on website, and patient would like to speak with Dr. Torrez concerning medication refill. Please call patient.     Phone number to reach patient:  Home number on file [phone] (home) or Cell number on file:    Telephone Information:   Mobile [phone]       Best Time:  Any time    Can we leave a detailed message on this number?  YES    Travel screening: Not Applicable

## 2021-07-20 NOTE — TELEPHONE ENCOUNTER
Left message on answering machine for patient/parent to call back.   [phone]  Sarah Patel RN

## 2021-07-20 NOTE — TELEPHONE ENCOUNTER
The patient has moved to [place]. He reports that the insurance he has in [employer] [place] will not fill Prescription(s) written by a provider that is not in [facility].  When he goes to [facility] he has to pay out of pocket. He has not established care in [place]. I asked what we need to do to support him today.  He repots that he needs his records transferred from [place] to his new clinic.    Nursing advice: Patient was advised about how to get a release of information from the web site.  He became angry and started yelling.  I asked why he was do upset and he stated nothing you told me how to get the release.  I asked if he needed more support than that as this is an insurance issue and he yelled again that he should have established care when he went to [facility].  I told him that it is always a good idea to establish care right away when going to a new place.  I asked if I can do anything else to support him and he stated no. Patient verbalizes good understanding, agrees with plan and states he needs no further support. Nakia Estrada R.N.

## 2021-09-26 ENCOUNTER — HEALTH MAINTENANCE LETTER (OUTPATIENT)
Age: 61
End: 2021-09-26

## 2022-01-16 ENCOUNTER — HEALTH MAINTENANCE LETTER (OUTPATIENT)
Age: 62
End: 2022-01-16

## 2022-05-11 ENCOUNTER — TELEPHONE (OUTPATIENT)
Dept: FAMILY MEDICINE | Facility: CLINIC | Age: 62
End: 2022-05-11
Payer: COMMERCIAL

## 2022-05-11 NOTE — TELEPHONE ENCOUNTER
Patient Quality Outreach    Patient is due for the following:   Asthma  -  Asthma follow-up visit  Depression  -  Depression follow-up visit  Physical  - due now    NEXT STEPS:   Schedule a yearly physical    Type of outreach:    Sent [email or web address] message.      Questions for provider review:    None     Miranda Galeas MA

## 2022-06-03 ENCOUNTER — TELEPHONE (OUTPATIENT)
Dept: ALLERGY | Facility: CLINIC | Age: 62
End: 2022-06-03
Payer: COMMERCIAL

## 2022-06-03 NOTE — TELEPHONE ENCOUNTER
I saw the patient once more than a year ago.  He failed to follow-up.  The patient may have the previous note. But I will not be able to provide him a note saying that he cannot blow into a device, because I am not sure what the device is.  He was able to perform pulmonary function test, which consists of blowing into a device.  We do treat patients with asthma that is quite severe.  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  This is a first-time request of this kind.      Dudley Quezada MD

## 2022-06-03 NOTE — TELEPHONE ENCOUNTER
Reason for Call:  Other call back    Detailed comments: Pt states he needs a complete break down of his last office visit and note from Doctor. Pt states he needs to prove that he is not able to blow into device that the state of [place] has installed in his vehicle. Pt states he needs note and documentation from doctor state that he is unable to blow into device because of his allergies and that he is on multiple medications. Pt would like letter and documentation faxed to [place] Dept of Public Safety,  and Vehicle Services- fax:  [phone]. Please notify pt when request is sent.     Phone Number Patient can be reached at: Cell number on file:    Telephone Information:   Mobile [phone]       Best Time: any    Can we leave a detailed message on this number? YES    Call taken on 6/3/2022 at 12:09 PM by Stefania Gardner

## 2022-06-03 NOTE — TELEPHONE ENCOUNTER
Left detailed message as stated below. Informed to call back with any questions.     Clarisse FLORES RN  Specialty/Allergy Clinics

## 2022-08-03 ENCOUNTER — TELEPHONE (OUTPATIENT)
Dept: FAMILY MEDICINE | Facility: CLINIC | Age: 62
End: 2022-08-03

## 2022-08-03 NOTE — TELEPHONE ENCOUNTER
Patient Quality Outreach    Patient is due for the following:   Asthma  -  ACT needed  Hypertension -  Hypertension follow-up visit  Depression  -  Depression follow-up visit  Physical Preventive Adult Physical    Next Steps:   Schedule a yearly physical    Type of outreach:    Sent [employer] message.      Questions for provider review:    None     Miranda Galeas MA

## 2023-01-08 ENCOUNTER — HEALTH MAINTENANCE LETTER (OUTPATIENT)
Age: 63
End: 2023-01-08

## 2023-04-23 ENCOUNTER — HEALTH MAINTENANCE LETTER (OUTPATIENT)
Age: 63
End: 2023-04-23

## 2023-07-16 ENCOUNTER — HEALTH MAINTENANCE LETTER (OUTPATIENT)
Age: 63
End: 2023-07-16
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
## (undated) DEVICE — PREP CHLORAPREP 26ML TINTED ORANGE  260815

## (undated) DEVICE — SOL WATER IRRIG 1000ML BOTTLE 07139-09

## (undated) RX ORDER — LIDOCAINE HYDROCHLORIDE 20 MG/ML
JELLY TOPICAL
Status: DISPENSED
Start: 2019-12-19

## (undated) RX ORDER — FENTANYL CITRATE 50 UG/ML
INJECTION, SOLUTION INTRAMUSCULAR; INTRAVENOUS
Status: DISPENSED
Start: 2019-12-19

## (undated) RX ORDER — REGADENOSON 0.08 MG/ML
INJECTION, SOLUTION INTRAVENOUS
Status: DISPENSED
Start: 2021-03-22

## (undated) RX ORDER — DIPHENHYDRAMINE HYDROCHLORIDE 50 MG/ML
INJECTION INTRAMUSCULAR; INTRAVENOUS
Status: DISPENSED
Start: 2019-12-19